# Patient Record
Sex: MALE | NOT HISPANIC OR LATINO
[De-identification: names, ages, dates, MRNs, and addresses within clinical notes are randomized per-mention and may not be internally consistent; named-entity substitution may affect disease eponyms.]

---

## 2017-03-10 ENCOUNTER — HOSPITAL ENCOUNTER (OUTPATIENT)
Dept: LAB | Age: 47
Setting detail: SPECIMEN
Discharge: HOME | End: 2017-03-10

## 2017-08-17 ENCOUNTER — RECORDS - HEALTHEAST (OUTPATIENT)
Dept: LAB | Facility: CLINIC | Age: 47
End: 2017-08-17

## 2017-08-17 LAB — AST SERPL W P-5'-P-CCNC: 22 U/L (ref 0–40)

## 2018-06-05 ENCOUNTER — RECORDS - HEALTHEAST (OUTPATIENT)
Dept: LAB | Facility: CLINIC | Age: 48
End: 2018-06-05

## 2018-06-05 LAB
ANION GAP SERPL CALCULATED.3IONS-SCNC: 9 MMOL/L (ref 5–18)
BUN SERPL-MCNC: 10 MG/DL (ref 8–22)
CALCIUM SERPL-MCNC: 9.1 MG/DL (ref 8.5–10.5)
CHLORIDE BLD-SCNC: 108 MMOL/L (ref 98–107)
CO2 SERPL-SCNC: 25 MMOL/L (ref 22–31)
CREAT SERPL-MCNC: 1.07 MG/DL (ref 0.7–1.3)
GFR SERPL CREATININE-BSD FRML MDRD: >60 ML/MIN/1.73M2
GLUCOSE BLD-MCNC: 102 MG/DL (ref 70–125)
POTASSIUM BLD-SCNC: 4.3 MMOL/L (ref 3.5–5)
SODIUM SERPL-SCNC: 142 MMOL/L (ref 136–145)

## 2018-08-14 ENCOUNTER — RECORDS - HEALTHEAST (OUTPATIENT)
Dept: LAB | Facility: CLINIC | Age: 48
End: 2018-08-14

## 2018-08-14 LAB
ALBUMIN SERPL-MCNC: 3.6 G/DL (ref 3.5–5)
ALP SERPL-CCNC: 72 U/L (ref 45–120)
ALT SERPL W P-5'-P-CCNC: 11 U/L (ref 0–45)
ANION GAP SERPL CALCULATED.3IONS-SCNC: 11 MMOL/L (ref 5–18)
AST SERPL W P-5'-P-CCNC: 12 U/L (ref 0–40)
BILIRUB SERPL-MCNC: 0.4 MG/DL (ref 0–1)
BUN SERPL-MCNC: 10 MG/DL (ref 8–22)
CALCIUM SERPL-MCNC: 9.1 MG/DL (ref 8.5–10.5)
CHLORIDE BLD-SCNC: 107 MMOL/L (ref 98–107)
CHOLEST SERPL-MCNC: 217 MG/DL
CO2 SERPL-SCNC: 23 MMOL/L (ref 22–31)
CREAT SERPL-MCNC: 0.94 MG/DL (ref 0.7–1.3)
FASTING STATUS PATIENT QL REPORTED: NO
GFR SERPL CREATININE-BSD FRML MDRD: >60 ML/MIN/1.73M2
GLUCOSE BLD-MCNC: 78 MG/DL (ref 70–125)
HDLC SERPL-MCNC: 47 MG/DL
LDLC SERPL CALC-MCNC: 150 MG/DL
POTASSIUM BLD-SCNC: 4.4 MMOL/L (ref 3.5–5)
PROT SERPL-MCNC: 6.6 G/DL (ref 6–8)
PSA SERPL-MCNC: 0.3 NG/ML (ref 0–2.5)
SODIUM SERPL-SCNC: 141 MMOL/L (ref 136–145)
TRIGL SERPL-MCNC: 98 MG/DL
VALPROATE SERPL-MCNC: 73 UG/ML (ref 50–150)

## 2018-10-12 ASSESSMENT — MIFFLIN-ST. JEOR: SCORE: 1836.48

## 2018-10-17 ENCOUNTER — ANESTHESIA - HEALTHEAST (OUTPATIENT)
Dept: SURGERY | Facility: AMBULATORY SURGERY CENTER | Age: 48
End: 2018-10-17

## 2018-10-18 ENCOUNTER — SURGERY - HEALTHEAST (OUTPATIENT)
Dept: SURGERY | Facility: AMBULATORY SURGERY CENTER | Age: 48
End: 2018-10-18

## 2018-10-18 ASSESSMENT — MIFFLIN-ST. JEOR: SCORE: 1836.48

## 2019-03-01 ENCOUNTER — AMBULATORY - HEALTHEAST (OUTPATIENT)
Dept: PALLIATIVE MEDICINE | Facility: OTHER | Age: 49
End: 2019-03-01

## 2019-03-01 DIAGNOSIS — M79.671 RIGHT FOOT PAIN: ICD-10-CM

## 2019-04-23 ENCOUNTER — RECORDS - HEALTHEAST (OUTPATIENT)
Dept: ADMINISTRATIVE | Facility: OTHER | Age: 49
End: 2019-04-23

## 2019-04-23 ENCOUNTER — HOSPITAL ENCOUNTER (OUTPATIENT)
Dept: PALLIATIVE MEDICINE | Facility: OTHER | Age: 49
Discharge: HOME | End: 2019-04-23
Attending: PODIATRIST

## 2019-04-23 DIAGNOSIS — G89.4 CHRONIC PAIN SYNDROME: ICD-10-CM

## 2019-04-23 DIAGNOSIS — M79.671 RIGHT FOOT PAIN: ICD-10-CM

## 2019-04-23 RX ORDER — OXYCODONE AND ACETAMINOPHEN 10; 325 MG/1; MG/1
1 TABLET ORAL EVERY 4 HOURS PRN
Status: SHIPPED | COMMUNITY
Start: 2019-04-23

## 2019-04-23 RX ORDER — NORTRIPTYLINE HCL 25 MG
25 CAPSULE ORAL AT BEDTIME
Status: SHIPPED | COMMUNITY
Start: 2019-04-23

## 2019-04-23 ASSESSMENT — MIFFLIN-ST. JEOR: SCORE: 1890.91

## 2019-04-24 ENCOUNTER — COMMUNICATION - HEALTHEAST (OUTPATIENT)
Dept: PALLIATIVE MEDICINE | Facility: OTHER | Age: 49
End: 2019-04-24

## 2019-05-10 ENCOUNTER — OFFICE VISIT - HEALTHEAST (OUTPATIENT)
Dept: PHYSICAL THERAPY | Facility: REHABILITATION | Age: 49
End: 2019-05-10

## 2019-05-10 DIAGNOSIS — D21.21 FIBROMA OF RIGHT FOOT: ICD-10-CM

## 2019-05-10 DIAGNOSIS — R26.81 UNSTEADY GAIT: ICD-10-CM

## 2019-05-10 DIAGNOSIS — M25.60 LIMITED JOINT RANGE OF MOTION (ROM): ICD-10-CM

## 2019-05-10 DIAGNOSIS — M72.2 PLANTAR FASCIITIS: ICD-10-CM

## 2019-05-17 ENCOUNTER — OFFICE VISIT - HEALTHEAST (OUTPATIENT)
Dept: PHYSICAL THERAPY | Facility: REHABILITATION | Age: 49
End: 2019-05-17

## 2019-05-17 DIAGNOSIS — D21.21 FIBROMA OF RIGHT FOOT: ICD-10-CM

## 2019-05-17 DIAGNOSIS — M72.2 PLANTAR FASCIITIS: ICD-10-CM

## 2019-05-17 DIAGNOSIS — M25.60 LIMITED JOINT RANGE OF MOTION (ROM): ICD-10-CM

## 2019-05-17 DIAGNOSIS — R26.81 UNSTEADY GAIT: ICD-10-CM

## 2019-05-24 ENCOUNTER — OFFICE VISIT - HEALTHEAST (OUTPATIENT)
Dept: PHYSICAL THERAPY | Facility: REHABILITATION | Age: 49
End: 2019-05-24

## 2019-05-24 DIAGNOSIS — M25.60 LIMITED JOINT RANGE OF MOTION (ROM): ICD-10-CM

## 2019-05-24 DIAGNOSIS — D21.21 FIBROMA OF RIGHT FOOT: ICD-10-CM

## 2019-05-24 DIAGNOSIS — R26.81 UNSTEADY GAIT: ICD-10-CM

## 2019-05-24 DIAGNOSIS — M72.2 PLANTAR FASCIITIS: ICD-10-CM

## 2019-05-31 ENCOUNTER — OFFICE VISIT - HEALTHEAST (OUTPATIENT)
Dept: PHYSICAL THERAPY | Facility: REHABILITATION | Age: 49
End: 2019-05-31

## 2019-05-31 DIAGNOSIS — M72.2 PLANTAR FASCIITIS: ICD-10-CM

## 2019-05-31 DIAGNOSIS — R26.81 UNSTEADY GAIT: ICD-10-CM

## 2019-05-31 DIAGNOSIS — D21.21 FIBROMA OF RIGHT FOOT: ICD-10-CM

## 2019-05-31 DIAGNOSIS — M25.60 LIMITED JOINT RANGE OF MOTION (ROM): ICD-10-CM

## 2019-06-14 ENCOUNTER — OFFICE VISIT - HEALTHEAST (OUTPATIENT)
Dept: PHYSICAL THERAPY | Facility: REHABILITATION | Age: 49
End: 2019-06-14

## 2019-06-14 DIAGNOSIS — D21.21 FIBROMA OF RIGHT FOOT: ICD-10-CM

## 2019-06-14 DIAGNOSIS — R26.81 UNSTEADY GAIT: ICD-10-CM

## 2019-06-14 DIAGNOSIS — M25.60 LIMITED JOINT RANGE OF MOTION (ROM): ICD-10-CM

## 2019-06-14 DIAGNOSIS — M72.2 PLANTAR FASCIITIS: ICD-10-CM

## 2019-06-21 ENCOUNTER — OFFICE VISIT - HEALTHEAST (OUTPATIENT)
Dept: PHYSICAL THERAPY | Facility: REHABILITATION | Age: 49
End: 2019-06-21

## 2019-06-21 DIAGNOSIS — D21.21 FIBROMA OF RIGHT FOOT: ICD-10-CM

## 2019-06-21 DIAGNOSIS — M72.2 PLANTAR FASCIITIS: ICD-10-CM

## 2019-06-21 DIAGNOSIS — R26.81 UNSTEADY GAIT: ICD-10-CM

## 2019-06-21 DIAGNOSIS — M25.60 LIMITED JOINT RANGE OF MOTION (ROM): ICD-10-CM

## 2019-07-12 ENCOUNTER — OFFICE VISIT - HEALTHEAST (OUTPATIENT)
Dept: PHYSICAL THERAPY | Facility: REHABILITATION | Age: 49
End: 2019-07-12

## 2019-07-12 DIAGNOSIS — M72.2 PLANTAR FASCIITIS: ICD-10-CM

## 2019-07-12 DIAGNOSIS — R26.81 UNSTEADY GAIT: ICD-10-CM

## 2019-07-12 DIAGNOSIS — D21.21 FIBROMA OF RIGHT FOOT: ICD-10-CM

## 2019-07-12 DIAGNOSIS — M25.60 LIMITED JOINT RANGE OF MOTION (ROM): ICD-10-CM

## 2019-07-26 ENCOUNTER — OFFICE VISIT - HEALTHEAST (OUTPATIENT)
Dept: PHYSICAL THERAPY | Facility: REHABILITATION | Age: 49
End: 2019-07-26

## 2019-07-26 DIAGNOSIS — D21.21 FIBROMA OF RIGHT FOOT: ICD-10-CM

## 2019-07-26 DIAGNOSIS — R26.81 UNSTEADY GAIT: ICD-10-CM

## 2019-07-26 DIAGNOSIS — M25.60 LIMITED JOINT RANGE OF MOTION (ROM): ICD-10-CM

## 2019-07-26 DIAGNOSIS — M72.2 PLANTAR FASCIITIS: ICD-10-CM

## 2019-08-02 ENCOUNTER — OFFICE VISIT - HEALTHEAST (OUTPATIENT)
Dept: PHYSICAL THERAPY | Facility: REHABILITATION | Age: 49
End: 2019-08-02

## 2019-08-02 DIAGNOSIS — M72.2 PLANTAR FASCIITIS: ICD-10-CM

## 2019-08-02 DIAGNOSIS — D21.21 FIBROMA OF RIGHT FOOT: ICD-10-CM

## 2019-08-02 DIAGNOSIS — R26.81 UNSTEADY GAIT: ICD-10-CM

## 2019-08-02 DIAGNOSIS — M25.60 LIMITED JOINT RANGE OF MOTION (ROM): ICD-10-CM

## 2019-08-09 ENCOUNTER — OFFICE VISIT - HEALTHEAST (OUTPATIENT)
Dept: PHYSICAL THERAPY | Facility: REHABILITATION | Age: 49
End: 2019-08-09

## 2019-08-09 DIAGNOSIS — R26.81 UNSTEADY GAIT: ICD-10-CM

## 2019-08-09 DIAGNOSIS — M72.2 PLANTAR FASCIITIS: ICD-10-CM

## 2019-08-09 DIAGNOSIS — M25.60 LIMITED JOINT RANGE OF MOTION (ROM): ICD-10-CM

## 2019-08-09 DIAGNOSIS — D21.21 FIBROMA OF RIGHT FOOT: ICD-10-CM

## 2019-08-16 ENCOUNTER — OFFICE VISIT - HEALTHEAST (OUTPATIENT)
Dept: PHYSICAL THERAPY | Facility: REHABILITATION | Age: 49
End: 2019-08-16

## 2019-08-16 DIAGNOSIS — M25.60 LIMITED JOINT RANGE OF MOTION (ROM): ICD-10-CM

## 2019-08-16 DIAGNOSIS — R26.81 UNSTEADY GAIT: ICD-10-CM

## 2019-08-16 DIAGNOSIS — M72.2 PLANTAR FASCIITIS: ICD-10-CM

## 2019-08-16 DIAGNOSIS — D21.21 FIBROMA OF RIGHT FOOT: ICD-10-CM

## 2019-08-23 ENCOUNTER — OFFICE VISIT - HEALTHEAST (OUTPATIENT)
Dept: PHYSICAL THERAPY | Facility: REHABILITATION | Age: 49
End: 2019-08-23

## 2019-08-23 DIAGNOSIS — R26.81 UNSTEADY GAIT: ICD-10-CM

## 2019-08-23 DIAGNOSIS — M72.2 PLANTAR FASCIITIS: ICD-10-CM

## 2019-08-23 DIAGNOSIS — D21.21 FIBROMA OF RIGHT FOOT: ICD-10-CM

## 2019-08-23 DIAGNOSIS — M25.60 LIMITED JOINT RANGE OF MOTION (ROM): ICD-10-CM

## 2019-09-20 ENCOUNTER — OFFICE VISIT - HEALTHEAST (OUTPATIENT)
Dept: PHYSICAL THERAPY | Facility: REHABILITATION | Age: 49
End: 2019-09-20

## 2019-09-20 DIAGNOSIS — R26.81 UNSTEADY GAIT: ICD-10-CM

## 2019-09-20 DIAGNOSIS — M25.60 LIMITED JOINT RANGE OF MOTION (ROM): ICD-10-CM

## 2019-09-20 DIAGNOSIS — M72.2 PLANTAR FASCIITIS: ICD-10-CM

## 2019-09-20 DIAGNOSIS — D21.21 FIBROMA OF RIGHT FOOT: ICD-10-CM

## 2019-09-30 ENCOUNTER — AMBULATORY - HEALTHEAST (OUTPATIENT)
Dept: ADMINISTRATIVE | Facility: REHABILITATION | Age: 49
End: 2019-09-30

## 2019-09-30 DIAGNOSIS — M79.671 RIGHT FOOT PAIN: ICD-10-CM

## 2019-10-11 ENCOUNTER — OFFICE VISIT - HEALTHEAST (OUTPATIENT)
Dept: PHYSICAL THERAPY | Facility: REHABILITATION | Age: 49
End: 2019-10-11

## 2019-10-11 DIAGNOSIS — D21.21 FIBROMA OF RIGHT FOOT: ICD-10-CM

## 2019-10-11 DIAGNOSIS — M25.60 LIMITED JOINT RANGE OF MOTION (ROM): ICD-10-CM

## 2019-10-11 DIAGNOSIS — R26.81 UNSTEADY GAIT: ICD-10-CM

## 2019-10-11 DIAGNOSIS — M72.2 PLANTAR FASCIITIS: ICD-10-CM

## 2019-11-01 ENCOUNTER — RECORDS - HEALTHEAST (OUTPATIENT)
Dept: LAB | Facility: CLINIC | Age: 49
End: 2019-11-01

## 2019-11-01 LAB
ALBUMIN SERPL-MCNC: 3.9 G/DL (ref 3.5–5)
ALP SERPL-CCNC: 90 U/L (ref 45–120)
ALT SERPL W P-5'-P-CCNC: 41 U/L (ref 0–45)
ANION GAP SERPL CALCULATED.3IONS-SCNC: 9 MMOL/L (ref 5–18)
AST SERPL W P-5'-P-CCNC: 28 U/L (ref 0–40)
BILIRUB SERPL-MCNC: 0.3 MG/DL (ref 0–1)
BUN SERPL-MCNC: 9 MG/DL (ref 8–22)
CALCIUM SERPL-MCNC: 9.4 MG/DL (ref 8.5–10.5)
CHLORIDE BLD-SCNC: 110 MMOL/L (ref 98–107)
CHOLEST SERPL-MCNC: 262 MG/DL
CO2 SERPL-SCNC: 25 MMOL/L (ref 22–31)
CREAT SERPL-MCNC: 1.06 MG/DL (ref 0.7–1.3)
FASTING STATUS PATIENT QL REPORTED: ABNORMAL
GFR SERPL CREATININE-BSD FRML MDRD: >60 ML/MIN/1.73M2
GLUCOSE BLD-MCNC: 99 MG/DL (ref 70–125)
HDLC SERPL-MCNC: 54 MG/DL
LDLC SERPL CALC-MCNC: 184 MG/DL
POTASSIUM BLD-SCNC: 4.5 MMOL/L (ref 3.5–5)
PROT SERPL-MCNC: 7.2 G/DL (ref 6–8)
PSA SERPL-MCNC: 0.4 NG/ML (ref 0–2.5)
SODIUM SERPL-SCNC: 144 MMOL/L (ref 136–145)
TRIGL SERPL-MCNC: 118 MG/DL

## 2020-05-13 ENCOUNTER — RECORDS - HEALTHEAST (OUTPATIENT)
Dept: ADMINISTRATIVE | Facility: OTHER | Age: 50
End: 2020-05-13

## 2020-08-28 ENCOUNTER — RECORDS - HEALTHEAST (OUTPATIENT)
Dept: LAB | Facility: CLINIC | Age: 50
End: 2020-08-28

## 2020-08-28 LAB
ANION GAP SERPL CALCULATED.3IONS-SCNC: 9 MMOL/L (ref 5–18)
BUN SERPL-MCNC: 8 MG/DL (ref 8–22)
CALCIUM SERPL-MCNC: 8.8 MG/DL (ref 8.5–10.5)
CHLORIDE BLD-SCNC: 108 MMOL/L (ref 98–107)
CHOLEST SERPL-MCNC: 179 MG/DL
CO2 SERPL-SCNC: 24 MMOL/L (ref 22–31)
CREAT SERPL-MCNC: 0.95 MG/DL (ref 0.7–1.3)
FASTING STATUS PATIENT QL REPORTED: NORMAL
GFR SERPL CREATININE-BSD FRML MDRD: >60 ML/MIN/1.73M2
GLUCOSE BLD-MCNC: 92 MG/DL (ref 70–125)
HDLC SERPL-MCNC: 45 MG/DL
LDLC SERPL CALC-MCNC: 117 MG/DL
POTASSIUM BLD-SCNC: 4.2 MMOL/L (ref 3.5–5)
PSA SERPL-MCNC: 0.7 NG/ML (ref 0–3.5)
SODIUM SERPL-SCNC: 141 MMOL/L (ref 136–145)
TRIGL SERPL-MCNC: 83 MG/DL

## 2021-05-25 ENCOUNTER — RECORDS - HEALTHEAST (OUTPATIENT)
Dept: ADMINISTRATIVE | Facility: CLINIC | Age: 51
End: 2021-05-25

## 2021-05-26 ENCOUNTER — RECORDS - HEALTHEAST (OUTPATIENT)
Dept: ADMINISTRATIVE | Facility: CLINIC | Age: 51
End: 2021-05-26

## 2021-05-28 NOTE — PROGRESS NOTES
[facility] Daily Progress     Patient Name: Kieran Quiroz  Date: 5/17/2019   Visit #: 2/12  PTA visit #:    Referral Diagnosis:  Right foot pain [M79.671]   Referring provider: Hussein Feliciano MD  Visit Diagnosis: No diagnosis found.  Precautions and medical history: Comorbid conditions include chronic headache/migraine on Aimovig, Major depression with psychotic features, chronic PTSD, Social anxiety disorder, MISHA, obsessive-compulsive disorder, alcohol dependence substance abuse in remission, marijuana use, schizoaffective disorder bipolar type. Pt reports chronic LBP and L shoulder pain   (Evaluate and treat right foot pain, hx fibroma excision x 2 () no further surgery recommended at this time.  Desensitization, scar/adhesion therapies.)  Assessment:   From Evaluation: Pt is a 48 y.o. year old male with right foot pain with hx of fibroma that started in 2016.  Pt had fibroma removed x2 but continues to grow back.  Patient has difficulty with standing, ambulating, working and sleeping.  Clinical findings include moderately sized plantar fibroma, hypersensitivity in plantar surface of foot, limited ankle ROM and strength.  Patient demonstrates understanding/independence with home program.  Patient is benefitting from skilled physical therapy and is making steady progress toward functional goals.  Patient is appropriate to continue with skilled physical therapy intervention, as indicated by initial plan of care.    Goal Status:  Pt. will demonstrate/verbalize independence in self-management of condition in : 12 weeks  Pt. will be independent with home exercise program in : 6 weeks  Pt. will report decreased intensity, frequency of : in 12 weeks;Comment  Comment:: in right foot  Pt. will have improved quality of sleep: with less pain;Comment  Comment:: in right foot while laying prone   Pt. will be able to walk : 10 minutes;with less pain;with less difficulty;for household mobility;in 12  weeks  Patient will stand : 10 minutes;with less pain;with less difficultty;in 12 weeks    No data recorded    Plan / Patient Education:     Continue with initial plan of care.  Progress with home program as tolerated.   Plan for next session:     Subjective:     Pain Rating: not assessed   Pt in foot upon sitting after standing or walking for longer then 10-15 min.  Increased low back pain reported.  PCA is helping with ex's daily.       Objective:   Exercises:  Exercise #1: ankle pumps, circles and alphabet (through out the day in supine and sitting) - cues for increased ROM  Comment #1: DF stretch with BTB in sitting / PF strengthening with BTB (needs help placing band)   Exercise #2: Plantar Fascia stretch with assist   Comment #2: Band: BTB eversion, inversion, PF and DF     Improved ROM in MTP joint, continues to have pain with MTP extension.     Treatment Today    TREATMENT MINUTES COMMENTS   Evaluation     Self-care/ Home management     Manual therapy 8 R foot plantar surface, use of warm towel    Neuromuscular Re-education     Therapeutic Activity     Therapeutic Exercises 8  See ex;s flow sheet    Gait training  Disssed use of cane on L side and demonstrated but pt reported L shoulder pain and inability to perform.  Also unable to use crunches due to L shoulder.    Modality________US_______ 8 plus set up               Total 25    Blank areas are intentional and mean the treatment did not include these items.       Sharon Zhu, PT, DPT   5/17/2019
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
## 2021-05-28 NOTE — TELEPHONE ENCOUNTER
Prior Authorization Request  Who s requesting:  KETURAH Reynolds CMA  Pharmacy Name and Location:   [facility]  Medication Name:  Lidocaine 5% patches  Insurance Plan:   [employer] MA  Insurance Member ID Number:   [id number]  Informed patient that prior authorizations can take up to 10 business days for response:   Yes  Okay to leave a detailed message: Yes

## 2021-05-28 NOTE — TELEPHONE ENCOUNTER
Central PA team  [phone]  Pool: HE PA MED ([id number])          PA has been initiated.       PA form completed and faxed insurance via Cover My Meds     Key:  [id number]     Medication:  Compound    [facility]         Response will be received via fax and may take up to 5-10 business days depending on plan

## 2021-05-29 ENCOUNTER — RECORDS - HEALTHEAST (OUTPATIENT)
Dept: ADMINISTRATIVE | Facility: CLINIC | Age: 51
End: 2021-05-29

## 2021-05-29 NOTE — PROGRESS NOTES
[facility] Daily Progress     Patient Name: Kieran Quiroz  Date: 6/21/2019   Visit #: 6/12  PTA visit #:    Referral Diagnosis:  Right foot pain [M79.671]   Referring provider: Hussein Feliciano MD  Visit Diagnosis:     ICD-10-CM    1. Fibroma of right foot D21.21    2. Plantar fasciitis M72.2    3. Unsteady gait R26.81    4. Limited joint range of motion (ROM) M25.60    Precautions and medical history: Comorbid conditions include chronic headache/migraine on Aimovig, Major depression with psychotic features, chronic PTSD, Social anxiety disorder, MISHA, obsessive-compulsive disorder, alcohol dependence substance abuse in remission, marijuana use, schizoaffective disorder bipolar type. Pt reports chronic LBP and L shoulder pain   (Evaluate and treat right foot pain, hx fibroma excision x 2 () no further surgery recommended at this time.  Desensitization, scar/adhesion therapies.)  Assessment:       From Evaluation: Pt is a 48 y.o. year old male with right foot pain with hx of fibroma that started in 2016.  Pt had fibroma removed x2 but continues to grow back.  Patient has difficulty with standing, ambulating, working and sleeping.  Clinical findings include moderately sized plantar fibroma, hypersensitivity in plantar surface of foot, limited ankle ROM and strength.  Patient demonstrates understanding/independence with home program.  Patient is benefitting from skilled physical therapy and is making steady progress toward functional goals.  Patient is appropriate to continue with skilled physical therapy intervention, as indicated by initial plan of care.    Goal Status:  Pt. will demonstrate/verbalize independence in self-management of condition in : 12 weeks  Pt. will be independent with home exercise program in : 6 weeks  Pt. will report decreased intensity, frequency of : in 12 weeks;Comment  Comment:: in right foot Progressing  Pt. will have improved quality of sleep: with less pain;Comment  MET no throbbing at night   Comment:: in right foot while laying prone  - up and down but increased medication   Pt. will be able to walk : 10 minutes;with less pain;with less difficulty;for household mobility;in 12 weeks - walked 4 blocks and had increased pain - iced and elevated   Patient will stand : 10 minutes;with less pain;with less difficultty;in 12 weeks MET able to stand 15-20 minutes    No data recorded    Plan / Patient Education:     Continue with initial plan of care.  Progress with home program as tolerated.   Plan for next session: US, K-tape, review HEP  2nd MTP joint mobs     Subjective:   Pt reports 35-40% improvement   Pt has not been able to obtain new orthotics for tennis shoes, continues to wear older orthotics in slip on shoes.   Still sore with standing a lot - soreness after 15-20 min   Still has to take weight off R side with cane  Pain overall has improved, no longer waking him up at night with pain.  Able to perform ex's with greater ease.     Objective:   Exercises:  Exercise #1: ankle pumps, circles and alphabet (through out the day in supine and sitting) - cues for increased ROM  Comment #1: DF stretch with BTB in sitting / PF strengthening with BTB (needs help placing band)   Exercise #2: Plantar Fascia stretch with assist   Comment #2: Band: BTB eversion, inversion, PF and DF   Exercise #3: gastroc stretch on stair (R side) 30 seconds x2    Today found pain in 2nd MTP joint with pressure along with pain in fibroma/arch of foot.     Past session: Discussed recent dx of peripheral neuropathy.  Pt reports numbness in his feet a few times a day.  Recommended regular foot care to avoid cuts/infection.     Treatment Today    TREATMENT MINUTES COMMENTS   Evaluation     Self-care/ Home management     Manual therapy 20 R foot plantar surface, use of warm towel x5  Warm towel with DF   STM to plantar surface of foot   Plantar fascia stretching with MTP joint ext  MTP 2nd digit mobs, STM   "  Neuromuscular Re-education 2 K-Tape  Prior to application skin was cleaned with alcohol wipe  1 strips were applied to plantar surface with mild stretch.  2 strips applied to plantar surface of foot one at MTP joint and one up medial aspect of ankle to support arch.  No stretch applied to 2\" ends of tape.   Pt was supine during application.   Described how to apply plantar taping and gave tape to apply at home.     Therapeutic Activity     Therapeutic Exercises  See ex;s flow sheet    Gait training     Modality________US_______ 8 plus set up 1.5 power, cont., 1.0MHz to plantar aspect of foot around scar and fibroma               Total 30    Blank areas are intentional and mean the treatment did not include these items.       Sharon Zhu, PT, DPT   6/21/2019  "

## 2021-05-29 NOTE — PROGRESS NOTES
[facility] Daily Progress     Patient Name: Kieran Quiroz  Date: 5/31/2019   Visit #: 4/12  PTA visit #:    Referral Diagnosis:  Right foot pain [M79.671]   Referring provider: Hussein Feliciano MD  Visit Diagnosis: No diagnosis found.  Precautions and medical history: Comorbid conditions include chronic headache/migraine on Aimovig, Major depression with psychotic features, chronic PTSD, Social anxiety disorder, MISHA, obsessive-compulsive disorder, alcohol dependence substance abuse in remission, marijuana use, schizoaffective disorder bipolar type. Pt reports chronic LBP and L shoulder pain   (Evaluate and treat right foot pain, hx fibroma excision x 2 () no further surgery recommended at this time.  Desensitization, scar/adhesion therapies.)  Assessment:   From Evaluation: Pt is a 48 y.o. year old male with right foot pain with hx of fibroma that started in 2016.  Pt had fibroma removed x2 but continues to grow back.  Patient has difficulty with standing, ambulating, working and sleeping.  Clinical findings include moderately sized plantar fibroma, hypersensitivity in plantar surface of foot, limited ankle ROM and strength.  Patient demonstrates understanding/independence with home program.  Patient is benefitting from skilled physical therapy and is making steady progress toward functional goals.  Patient is appropriate to continue with skilled physical therapy intervention, as indicated by initial plan of care.    Goal Status:  Pt. will demonstrate/verbalize independence in self-management of condition in : 12 weeks  Pt. will be independent with home exercise program in : 6 weeks  Pt. will report decreased intensity, frequency of : in 12 weeks;Comment  Comment:: in right foot Progressing  Pt. will have improved quality of sleep: with less pain;Comment MET no throbbing at night   Comment:: in right foot while laying prone   Pt. will be able to walk : 10 minutes;with less pain;with less  "difficulty;for household mobility;in 12 weeks  Patient will stand : 10 minutes;with less pain;with less difficultty;in 12 weeks    No data recorded    Plan / Patient Education:     Continue with initial plan of care.  Progress with home program as tolerated.   Plan for next session: US, K-tape  Review and progress ex's.  Gastroc/solues stretch     Subjective:   Still sore with standing a lot - soreness after 15-20 min   Walking 10 min   Pain Ratin.5-5 with walking - still has to take weight off R side with cane  Pain overall has improved, no longer waking him up at night with pain.  Able to perform ex's with greater ease.   Pt reports cramping of R great toe into extension   Pt reports fibroma has shrunk.     Objective:   Exercises:  Exercise #1: ankle pumps, circles and alphabet (through out the day in supine and sitting) - cues for increased ROM  Comment #1: DF stretch with BTB in sitting / PF strengthening with BTB (needs help placing band)   Exercise #2: Plantar Fascia stretch with assist   Comment #2: Band: BTB eversion, inversion, PF and DF   *trialed Gastoc stretch against the wall - increased pain in plantar surface of foot.     Discussed recent dx of peripheral neuropathy.  Pt reports numbness in his feet a few times a day.  Recommended regular foot care to avoid cuts/infection.     Treatment Today    TREATMENT MINUTES COMMENTS   Evaluation     Self-care/ Home management     Manual therapy 11 R foot plantar surface, use of warm towel   STM to plantar surface of foot   Plantar fascia stretching with MTP joint ext and DF    Neuromuscular Re-education 2 K-Tape  Prior to application skin was cleaned with alcohol wipe  1 strips were applied to plantar surface with mild stretch.  No stretch applied to 2\" ends of tape.   Pt was supine during application.   Described how to apply plantar taping and gave tape to apply at home.     Therapeutic Activity     Therapeutic Exercises 2 See ex;s flow sheet    Gait " training     Modality________US_______ 8 plus set up 1.5 power, cont., 1.0MHz to plantar aspect of foot around scar and fibroma               Total 25    Blank areas are intentional and mean the treatment did not include these items.       Sharon Zhu, PT, DPT   5/31/2019

## 2021-05-29 NOTE — PROGRESS NOTES
[facility] Daily Progress     Patient Name: Kieran Quiroz  Date: 6/14/2019   Visit #: 5/12  PTA visit #:    Referral Diagnosis:  Right foot pain [M79.671]   Referring provider: Hussein Feliciano MD  Visit Diagnosis:     ICD-10-CM    1. Fibroma of right foot D21.21    2. Plantar fasciitis M72.2    3. Unsteady gait R26.81    4. Limited joint range of motion (ROM) M25.60      Precautions and medical history: Comorbid conditions include chronic headache/migraine on Aimovig, Major depression with psychotic features, chronic PTSD, Social anxiety disorder, MISHA, obsessive-compulsive disorder, alcohol dependence substance abuse in remission, marijuana use, schizoaffective disorder bipolar type. Pt reports chronic LBP and L shoulder pain   (Evaluate and treat right foot pain, hx fibroma excision x 2 () no further surgery recommended at this time.  Desensitization, scar/adhesion therapies.)  Assessment:   From Evaluation: Pt is a 48 y.o. year old male with right foot pain with hx of fibroma that started in 2016.  Pt had fibroma removed x2 but continues to grow back.  Patient has difficulty with standing, ambulating, working and sleeping.  Clinical findings include moderately sized plantar fibroma, hypersensitivity in plantar surface of foot, limited ankle ROM and strength.  Patient demonstrates understanding/independence with home program.  Patient is benefitting from skilled physical therapy and is making steady progress toward functional goals.  Patient is appropriate to continue with skilled physical therapy intervention, as indicated by initial plan of care.    Goal Status:  Pt. will demonstrate/verbalize independence in self-management of condition in : 12 weeks  Pt. will be independent with home exercise program in : 6 weeks  Pt. will report decreased intensity, frequency of : in 12 weeks;Comment  Comment:: in right foot Progressing  Pt. will have improved quality of sleep: with less pain;Comment  "MET no throbbing at night   Comment:: in right foot while laying prone  - up and down but increased medication   Pt. will be able to walk : 10 minutes;with less pain;with less difficulty;for household mobility;in 12 weeks - walked 4 blocks and had increased pain - iced and elevated   Patient will stand : 10 minutes;with less pain;with less difficultty;in 12 weeks MET able to stand 15-20 minutes    No data recorded    Plan / Patient Education:     Continue with initial plan of care.  Progress with home program as tolerated.   Plan for next session: US, K-tape    Subjective:   Pt reports 35-40% improvement   Still sore with standing a lot - soreness after 15-20 min   Walked 4 blocks resulted in increased pain   Still has to take weight off R side with cane  Pain overall has improved, no longer waking him up at night with pain.  Able to perform ex's with greater ease.     Objective:   Exercises:  Exercise #1: ankle pumps, circles and alphabet (through out the day in supine and sitting) - cues for increased ROM  Comment #1: DF stretch with BTB in sitting / PF strengthening with BTB (needs help placing band)   Exercise #2: Plantar Fascia stretch with assist   Comment #2: Band: BTB eversion, inversion, PF and DF   Exercise #3: gastroc stretch on stair (R side) 30 seconds x2    Past session: Discussed recent dx of peripheral neuropathy.  Pt reports numbness in his feet a few times a day.  Recommended regular foot care to avoid cuts/infection.     Treatment Today    TREATMENT MINUTES COMMENTS   Evaluation     Self-care/ Home management     Manual therapy 20 R foot plantar surface, use of warm towel   STM to plantar surface of foot   Plantar fascia stretching with MTP joint ext and DF    Neuromuscular Re-education 2 K-Tape  Prior to application skin was cleaned with alcohol wipe  1 strips were applied to plantar surface with mild stretch.  No stretch applied to 2\" ends of tape.   Pt was supine during application.   Described " how to apply plantar taping and gave tape to apply at home.     Therapeutic Activity     Therapeutic Exercises 2 See ex;s flow sheet    Gait training     Modality________US_______ 8 plus set up 1.5 power, cont., 1.0MHz to plantar aspect of foot around scar and fibroma               Total 32    Blank areas are intentional and mean the treatment did not include these items.       Sharon Zhu, PT, DPT   6/14/2019

## 2021-05-29 NOTE — PROGRESS NOTES
[facility] Daily Progress     Patient Name: Kieran Quiroz  Date: 5/24/2019   Visit #: 3/12  PTA visit #:    Referral Diagnosis:  Right foot pain [M79.671]   Referring provider: Hussein Feliciano MD  Visit Diagnosis: No diagnosis found.  Precautions and medical history: Comorbid conditions include chronic headache/migraine on Aimovig, Major depression with psychotic features, chronic PTSD, Social anxiety disorder, MISHA, obsessive-compulsive disorder, alcohol dependence substance abuse in remission, marijuana use, schizoaffective disorder bipolar type. Pt reports chronic LBP and L shoulder pain   (Evaluate and treat right foot pain, hx fibroma excision x 2 () no further surgery recommended at this time.  Desensitization, scar/adhesion therapies.)  Assessment:   From Evaluation: Pt is a 48 y.o. year old male with right foot pain with hx of fibroma that started in 2016.  Pt had fibroma removed x2 but continues to grow back.  Patient has difficulty with standing, ambulating, working and sleeping.  Clinical findings include moderately sized plantar fibroma, hypersensitivity in plantar surface of foot, limited ankle ROM and strength.  Patient demonstrates understanding/independence with home program.  Patient is benefitting from skilled physical therapy and is making steady progress toward functional goals.  Patient is appropriate to continue with skilled physical therapy intervention, as indicated by initial plan of care.    Goal Status:  Pt. will demonstrate/verbalize independence in self-management of condition in : 12 weeks  Pt. will be independent with home exercise program in : 6 weeks  Pt. will report decreased intensity, frequency of : in 12 weeks;Comment  Comment:: in right foot Progressing  Pt. will have improved quality of sleep: with less pain;Comment MET  Comment:: in right foot while laying prone   Pt. will be able to walk : 10 minutes;with less pain;with less difficulty;for household  "mobility;in 12 weeks  Patient will stand : 10 minutes;with less pain;with less difficultty;in 12 weeks    No data recorded    Plan / Patient Education:     Continue with initial plan of care.  Progress with home program as tolerated.   Plan for next session: , K-tape  Review and progress ex's.  Gastroc/solues stretch     Subjective:     Pain Ratin.5-5 with walking - still has to take weight off R side with cane  Pain overall has improved, no longer waking him up at night with pain.  Able to perform ex's with greater ease.   Pt reports cramping of R great toe into extension   Pt reports fibroma has shrunk.     Objective:   Exercises:  Exercise #1: ankle pumps, circles and alphabet (through out the day in supine and sitting) - cues for increased ROM  Comment #1: DF stretch with BTB in sitting / PF strengthening with BTB (needs help placing band)   Exercise #2: Plantar Fascia stretch with assist   Comment #2: Band: BTB eversion, inversion, PF and DF     Improved ROM and decreased with ankle eversion, inversion, PF, great toe MTP.  Continues to have pain in plantar surface of foot with DF and MTP ext.     Treatment Today    TREATMENT MINUTES COMMENTS   Evaluation     Self-care/ Home management     Manual therapy 23 R foot plantar surface, use of warm towel   PROM ankle eversion, inversion, DF, PF, great MTP joint  Plantar fascia stretching with MTP joint ext and DF    Neuromuscular Re-education 2 K-Tape  Prior to application skin was cleaned with alcohol wipe  2 strips were applied to plantar surface and medial side of ankle for arch support with mild stretch.  No stretch applied to 2\" ends of tape.   Pt was supine during application.   Described how to apply plantar taping and gave tape to apply at home.  Discussed prognosis, likely fibroma will not go away with therapy but pt can have decreased pain and increased mobility.      Therapeutic Activity     Therapeutic Exercises  See ex;s flow sheet    Gait training  " Disssed use of cane on L side and demonstrated but pt reported L shoulder pain and inability to perform.  Also unable to use crunches due to L shoulder.    Modality________US_______ 10 plus set up 1.5 power, cont., 1.0MHz to plantar aspect of foot around scar and fibroma               Total 38    Blank areas are intentional and mean the treatment did not include these items.       Sharon Zhu, PT, DPT   5/24/2019

## 2021-05-30 NOTE — PROGRESS NOTES
[facility] Daily Progress     Patient Name: Kieran Quiroz  Date: 7/26/2019   Visit #: 8/12 ([facility] would like to to have a total of 17 session)   [facility] visit #:    Referral Diagnosis:  Right foot pain [M79.671]   Referring provider: Hussein Feliciano MD  Visit Diagnosis:     ICD-10-CM    1. Fibroma of right foot D21.21    2. Plantar fasciitis M72.2    3. Unsteady gait R26.81    4. Limited joint range of motion (ROM) M25.60    Precautions and medical history: Comorbid conditions include chronic headache/migraine on Aimovig, Major depression with psychotic features, chronic PTSD, Social anxiety disorder, MISHA, obsessive-compulsive disorder, alcohol dependence substance abuse in remission, marijuana use, schizoaffective disorder bipolar type. Pt reports chronic LBP and L shoulder pain   (Evaluate and treat right foot pain, hx fibroma excision x 2 () no further surgery recommended at this time.  Desensitization, scar/adhesion therapies.)  Assessment:   Pt returned to Podiatrist.  Podiatrist would like to continue therapy for 2 more months (8 sessions), if minimal improvement will consider surgery.  Improved ROM and strength noted today but pain persists. STM, K-tape and ultrasound performed for pain relief.      From Evaluation: Pt is a 48 y.o. year old male with right foot pain with hx of fibroma that started in 2016.  Pt had fibroma removed x2 but continues to grow back.  Patient has difficulty with standing, ambulating, working and sleeping.  Clinical findings include moderately sized plantar fibroma, hypersensitivity in plantar surface of foot, limited ankle ROM and strength.  Patient demonstrates understanding/independence with home program.  Patient is benefitting from skilled physical therapy and is making steady progress toward functional goals.  Patient is appropriate to continue with skilled physical therapy intervention, as indicated by initial plan of care.    Goal  Status:  Pt. will demonstrate/verbalize independence in self-management of condition in : 12 weeks  Pt. will be independent with home exercise program in : 6 weeks  Pt. will report decreased intensity, frequency of : in 12 weeks;Comment  Comment:: in right foot Progressing  Pt. will have improved quality of sleep: with less pain;Comment MET no throbbing at night   Comment:: in right foot while laying prone  - up and down but increased medication   Pt. will be able to walk : 10 minutes;with less pain;with less difficulty;for household mobility;in 12 weeks - walked 4 blocks and had increased pain - iced and elevated   Patient will stand : 10 minutes;with less pain;with less difficultty;in 12 weeks MET able to stand 15-20 minutes    No data recorded    Plan / Patient Education:     Continue with initial plan of care.  Progress with home program as tolerated.   Plan for next session: US, K-tape, review HEP  2nd MTP joint mobs     Subjective:   Pain increased while he was on vacation.  Growth of fibroma reported.   Past session :Pt reports 35-40% improvement     From Evaluation:   Pain Ratin  Pain rating at best: 5  Pain rating at worst: 10  Pain description: burn, stabbing   Sitting: no pain   Standin-5 minutes   Walking 1-2 minutes     Objective:   Foot/Ankle ROM:                          Date: 5/10/19                7/26/19  In sitting           Ankle PROM ( )   Right    Left   Right   Left   Right   Left   Dorsiflexion-Gastroc (10 )  Supine with bolster    Lacking 5    5   4-5            Dorsiflexion-Soleus (20 )                     Plantar Flexion (50 )   25   55   42            Inversion (45-60 )                     Eversion (15-30 )                     Great Toe Extension (70 )   20 painful    WFL   46        Foot/Ankle Strength:                      Date:  5/10/19                7/26/19        Ankle/Foot MMT (/5)   Right   Left   Right   Left   Right   Left   Dorsiflexion Unable to assess due to limited  "ROM and pain    5          Plantar flexion Tested in supine able to press slightly  3+             Inversion               Eversion               Great Toe Extension Unable to perform through full range           Great toe extension: 5/5 MMT    Exercises:  Exercise #1: ankle pumps, circles and alphabet (through out the day in supine and sitting) - cues for increased ROM  Comment #1: DF stretch with BTB in sitting / PF strengthening with BTB (needs help placing band)   Exercise #2: Plantar Fascia stretch with assist   Comment #2: Band: BTB eversion, inversion, PF and DF   Exercise #3: gastroc stretch on stair (R side) 30 seconds x2      Treatment Today    TREATMENT MINUTES COMMENTS   Evaluation     Self-care/ Home management     Manual therapy 8 R foot plantar surface, use of warm towel x5  Warm towel with DF   STM to plantar surface of foot   Plantar fascia stretching with MTP joint ext  MTP 2nd digit mobs, STM   +K-Tape   Neuromuscular Re-education  K-Tape  Prior to application skin was cleaned with alcohol wipe  1 strips were applied to plantar surface with mild stretch.  2 strips applied to plantar surface of foot one at MTP joint and one up medial aspect of ankle to support arch.  No stretch applied to 2\" ends of tape.   Pt was supine during application.   Described how to apply plantar taping and gave tape to apply at home.     Therapeutic Activity     Therapeutic Exercises 7 Reassessment above   See exs flow sheet    Gait training     Modality________US_______ 8 plus set up 1.5 power, cont., 1.0MHz to plantar aspect of foot around scar and fibroma               Total 25    Blank areas are intentional and mean the treatment did not include these items.       Sharon Zhu, PT, DPT   7/26/2019  "

## 2021-05-30 NOTE — PROGRESS NOTES
[facility] Daily Progress     Patient Name: Kieran Quiroz  Date: 7/12/2019   Visit #: 7/12  PTA visit #:    Referral Diagnosis:  Right foot pain [M79.671]   Referring provider: Hussein Feliciano MD  Visit Diagnosis:     ICD-10-CM    1. Fibroma of right foot D21.21    2. Plantar fasciitis M72.2    3. Unsteady gait R26.81    4. Limited joint range of motion (ROM) M25.60    Precautions and medical history: Comorbid conditions include chronic headache/migraine on Aimovig, Major depression with psychotic features, chronic PTSD, Social anxiety disorder, MISHA, obsessive-compulsive disorder, alcohol dependence substance abuse in remission, marijuana use, schizoaffective disorder bipolar type. Pt reports chronic LBP and L shoulder pain   (Evaluate and treat right foot pain, hx fibroma excision x 2 () no further surgery recommended at this time.  Desensitization, scar/adhesion therapies.)  Assessment:   Pt will return to Podiatrist.  Pain has returned while on vacation.  Hasn't had therapy in 2 weeks.  Fibroma appears to have enlarged.  STM, K-tape and ultrasound performed for pain relief.  Will call to reschedule after Podiatrist appointment.     From Evaluation: Pt is a 48 y.o. year old male with right foot pain with hx of fibroma that started in 2016.  Pt had fibroma removed x2 but continues to grow back.  Patient has difficulty with standing, ambulating, working and sleeping.  Clinical findings include moderately sized plantar fibroma, hypersensitivity in plantar surface of foot, limited ankle ROM and strength.  Patient demonstrates understanding/independence with home program.  Patient is benefitting from skilled physical therapy and is making steady progress toward functional goals.  Patient is appropriate to continue with skilled physical therapy intervention, as indicated by initial plan of care.    Goal Status:  Pt. will demonstrate/verbalize independence in self-management of condition in : 12  weeks  Pt. will be independent with home exercise program in : 6 weeks  Pt. will report decreased intensity, frequency of : in 12 weeks;Comment  Comment:: in right foot Progressing  Pt. will have improved quality of sleep: with less pain;Comment MET no throbbing at night   Comment:: in right foot while laying prone  - up and down but increased medication   Pt. will be able to walk : 10 minutes;with less pain;with less difficulty;for household mobility;in 12 weeks - walked 4 blocks and had increased pain - iced and elevated   Patient will stand : 10 minutes;with less pain;with less difficultty;in 12 weeks MET able to stand 15-20 minutes    No data recorded    Plan / Patient Education:     Continue with initial plan of care.  Progress with home program as tolerated.   Plan for next session: US, K-tape, review HEP  2nd MTP joint mobs     Subjective:   Pain increased while he was on vacation.  Growth of fibroma reported.   Past session :Pt reports 35-40% improvement     From Evaluation:   Pain Ratin  Pain rating at best: 5  Pain rating at worst: 10  Pain description: burn, stabbing   Sitting: no pain   Standin-5 minutes   Walking 1-2 minutes     Objective:   Foot/Ankle ROM:                          Date: 5/10/19                 Ankle PROM ( )   Right    Left   Right   Left   Right   Left   Dorsiflexion-Gastroc (10 )  Supine with bolster    Lacking 5    5               Dorsiflexion-Soleus (20 )                     Plantar Flexion (50 )   25   55               Inversion (45-60 )                     Eversion (15-30 )                     Great Toe Extension (70 )   20 painful    WFL           Foot/Ankle Strength:                      Date:  5/10/19              Ankle/Foot MMT (/5)   Right   Left   Right   Left   Right   Left   Dorsiflexion Unable to assess due to limited ROM and pain             Plantar flexion Tested in supine able to press slightly  3+             Inversion               Eversion              "  Great Toe Extension Unable to perform through full range               Exercises:  Exercise #1: ankle pumps, circles and alphabet (through out the day in supine and sitting) - cues for increased ROM  Comment #1: DF stretch with BTB in sitting / PF strengthening with BTB (needs help placing band)   Exercise #2: Plantar Fascia stretch with assist   Comment #2: Band: BTB eversion, inversion, PF and DF   Exercise #3: gastroc stretch on stair (R side) 30 seconds x2      Treatment Today    TREATMENT MINUTES COMMENTS   Evaluation     Self-care/ Home management     Manual therapy 15 R foot plantar surface, use of warm towel x5  Warm towel with DF   STM to plantar surface of foot   Plantar fascia stretching with MTP joint ext  MTP 2nd digit mobs, STM    Neuromuscular Re-education 2 K-Tape  Prior to application skin was cleaned with alcohol wipe  1 strips were applied to plantar surface with mild stretch.  2 strips applied to plantar surface of foot one at MTP joint and one up medial aspect of ankle to support arch.  No stretch applied to 2\" ends of tape.   Pt was supine during application.   Described how to apply plantar taping and gave tape to apply at home.     Therapeutic Activity     Therapeutic Exercises  See ex;s flow sheet    Gait training     Modality________US_______ 8 plus set up 1.5 power, cont., 1.0MHz to plantar aspect of foot around scar and fibroma               Total 25    Blank areas are intentional and mean the treatment did not include these items.       Sharon Zhu, PT, DPT   7/12/2019  "

## 2021-05-31 NOTE — PROGRESS NOTES
[facility] Daily Progress     Patient Name: Kieran Quiroz  Date: 8/16/2019   Visit #: 11/17 ([place] [facility]rist would like to to have a total of 17 session)   [facility] [place] visit #:    Referral Diagnosis:  Right foot pain [M79.671]   Referring provider: Hussein Feliciano MD  Visit Diagnosis:     ICD-10-CM    1. Fibroma of right foot D21.21    2. Plantar fasciitis M72.2    3. Unsteady gait R26.81    4. Limited joint range of motion (ROM) M25.60    Precautions and medical history: Comorbid conditions include chronic headache/migraine on Aimovig, Major depression with psychotic features, chronic PTSD, Social anxiety disorder, MISHA, obsessive-compulsive disorder, alcohol dependence substance abuse in remission, marijuana use, schizoaffective disorder bipolar type. Pt reports chronic LBP and L shoulder pain   (Evaluate and treat right foot pain, hx fibroma excision x 2 () no further surgery recommended at this time.  Desensitization, scar/adhesion therapies.)  Assessment:   Pt reports significant pain relief, able to walk without pain - unless he steps on sharp object.  No pain in Metatarsal joint since last session.  Foot intrinsic ex's performed today - minimal cueing needed.     Past session: Pt returned to Podiatrist.  Podiatrist would like to continue therapy for 2 more months (7 sessions), if minimal improvement will consider surgery.      From Evaluation: Pt is a 48 y.o. year old male with right foot pain with hx of fibroma that started in 2016.  Pt had fibroma removed x2 but continues to grow back.  Patient has difficulty with standing, ambulating, working and sleeping.  Clinical findings include moderately sized plantar fibroma, hypersensitivity in plantar surface of foot, limited ankle ROM and strength.  Patient demonstrates understanding/independence with home program.  Patient is benefitting from skilled physical therapy and is making steady progress toward functional goals.  Patient is  appropriate to continue with skilled physical therapy intervention, as indicated by initial plan of care.    Goal Status:  Pt. will demonstrate/verbalize independence in self-management of condition in : 12 weeks  Pt. will be independent with home exercise program in : 6 weeks  Pt. will report decreased intensity, frequency of : in 12 weeks;Comment  Comment:: in right foot Progressing  Pt. will have improved quality of sleep: with less pain;Comment MET no throbbing at night   Comment:: in right foot while laying prone  - up and down but increased medication   Pt. will be able to walk : 10 minutes;with less pain;with less difficulty;for household mobility;in 12 weeks - walked 4 blocks and had increased pain - iced and elevated   Patient will stand : 10 minutes;with less pain;with less difficultty;in 12 weeks MET able to stand 15-20 minutes    No data recorded    Plan / Patient Education:     Continue with initial plan of care.  Progress with home program as tolerated.   Plan for next session: US, review HEP    Subjective:   Pt report he could walk 30 min (but hasn't tried) without too much pain in foot.  Low back is limiting factor with walking.  Will try to walk this week.     From Evaluation:   Pain Ratin  Pain rating at best: 5  Pain rating at worst: 10  Pain description: burn, stabbing   Sitting: no pain   Standin-5 minutes   Walking 1-2 minutes     Objective:   Foot/Ankle ROM:                          Date: 5/10/19                7/26/19  In sitting           Ankle PROM ( )   Right    Left   Right   Left   Right   Left   Dorsiflexion-Gastroc (10 )  Supine with bolster    Lacking 5    5   4-5            Dorsiflexion-Soleus (20 )                     Plantar Flexion (50 )   25   55   42            Inversion (45-60 )                     Eversion (15-30 )                     Great Toe Extension (70 )   20 painful    WFL   46        Foot/Ankle Strength:                      Date:  5/10/19                7/26/19         Ankle/Foot MMT (/5)   Right   Left   Right   Left   Right   Left   Dorsiflexion Unable to assess due to limited ROM and pain    5          Plantar flexion Tested in supine able to press slightly  3+             Inversion               Eversion               Great Toe Extension Unable to perform through full range             Pain in the metatarsophalangeal joint digit 2 and 3 in plantar aspect for foot with weightbearing and palpation. Pt reports he has had pain in this area for years as reported last session. Today pt report no pain in this area with ambulating. Minimal pain with palpation     Exercises:  Comment #1: DF stretch with BTB in sitting / PF strengthening with BTB (needs help placing band)   Comment #2: Band: GTB eversion, inversion, PF and DF   Exercise #3: gastroc stretch on stair (R side) 30 seconds x2  Comment #3:  Heel raises x10 bilaterally - results in low back pain   Exercise #4: Foot intrinsics: towel scrunch, great toe extension, digit 2-5 extension, toe extension/flexion       Treatment Today    TREATMENT MINUTES COMMENTS   Evaluation     Self-care/ Home management     Manual therapy 16 R foot plantar surface, use of warm towel    STM to plantar surface of foot   Plantar fascia stretching with MTP joint ext  Mobs to Metatarsophalangeal joints digits 2-3   - No K-tape applied: pt reports pain when tape comes off.  Foot intrinsics started instead to strengthen arch.    Neuromuscular Re-education       Therapeutic Activity     Therapeutic Exercises 12 See exs flow sheet    Gait training     Modality________US_______ 8 plus set up 1.5 power, cont., 1.0MHz to plantar aspect of foot around scar and fibroma               Total 38    Blank areas are intentional and mean the treatment did not include these items.       Sharon Zhu, PT, DPT   8/16/2019

## 2021-05-31 NOTE — PROGRESS NOTES
[facility] Daily Progress     Patient Name: Kieran Quiroz  Date: 8/23/2019   Visit #: 12/17 ([place] [facility]rist would like to to have a total of 17 session)   [facility] visit #:    Referral Diagnosis:  Right foot pain [M79.671]   Referring provider: Hussein Feliciano MD  Visit Diagnosis:     ICD-10-CM    1. Fibroma of right foot D21.21    2. Plantar fasciitis M72.2    3. Unsteady gait R26.81    4. Limited joint range of motion (ROM) M25.60    Precautions and medical history: Comorbid conditions include chronic headache/migraine on Aimovig, Major depression with psychotic features, chronic PTSD, Social anxiety disorder, MISHA, obsessive-compulsive disorder, alcohol dependence substance abuse in remission, marijuana use, schizoaffective disorder bipolar type. Pt reports chronic LBP and L shoulder pain   (Evaluate and treat right foot pain, hx fibroma excision x 2 () no further surgery recommended at this time.  Desensitization, scar/adhesion therapies.)  Assessment:   Past session: Pt returned to Podiatrist.  Podiatrist would like to continue therapy for 2 more months (7 sessions), if minimal improvement will consider surgery.      From Evaluation: Pt is a 48 y.o. year old male with right foot pain with hx of fibroma that started in 2016.  Pt had fibroma removed x2 but continues to grow back.  Patient has difficulty with standing, ambulating, working and sleeping.  Clinical findings include moderately sized plantar fibroma, hypersensitivity in plantar surface of foot, limited ankle ROM and strength.  Patient demonstrates understanding/independence with home program.  Patient is benefitting from skilled physical therapy and is making steady progress toward functional goals.  Patient is appropriate to continue with skilled physical therapy intervention, as indicated by initial plan of care.    Goal Status:  Pt. will demonstrate/verbalize independence in self-management of condition in : 12 weeks  Pt.  will be independent with home exercise program in : 6 weeks  Pt. will report decreased intensity, frequency of : in 12 weeks;Comment  Comment:: in right foot Progressing  Pt. will have improved quality of sleep: with less pain;Comment MET no throbbing at night   Comment:: in right foot while laying prone  - up and down but increased medication   Pt. will be able to walk : 10 minutes;with less pain;with less difficulty;for household mobility;in 12 weeks - walked 4 blocks and had increased pain - iced and elevated   Patient will stand : 10 minutes;with less pain;with less difficultty;in 12 weeks MET able to stand 15-20 minutes    No data recorded    Plan / Patient Education:     Continue with initial plan of care.  Progress with home program as tolerated.   Plan for next session: US, review HEP    Subjective:   R foot is a little more sore.  Went around a couple blocks (3 blocks 10-15 min)     Pt report he could walk 30 min (but hasn't tried) without too much pain in foot.  Low back is limiting factor with walking.  Will try to walk this week.     From Evaluation:   Pain Ratin  Pain rating at best: 5  Pain rating at worst: 10  Pain description: burn, stabbing   Sitting: no pain   Standin-5 minutes   Walking 1-2 minutes     Objective:   Foot/Ankle ROM:                          Date: 5/10/19                7/26/19  In sitting           Ankle PROM ( )   Right    Left   Right   Left   Right   Left   Dorsiflexion-Gastroc (10 )  Supine with bolster    Lacking 5    5   4-5            Dorsiflexion-Soleus (20 )                     Plantar Flexion (50 )   25   55   42            Inversion (45-60 )                     Eversion (15-30 )                     Great Toe Extension (70 )   20 painful    WFL   46        Foot/Ankle Strength:                      Date:  5/10/19                7/26/19        Ankle/Foot MMT (/5)   Right   Left   Right   Left   Right   Left   Dorsiflexion Unable to assess due to limited ROM and pain     5          Plantar flexion Tested in supine able to press slightly  3+             Inversion               Eversion               Great Toe Extension Unable to perform through full range             Past session: Pain in the metatarsophalangeal joint digit 2 and 3 in plantar aspect for foot with weightbearing and palpation. Pt reports he has had pain in this area for years as reported last session. Today pt report no pain in this area with ambulating. Minimal pain with palpation     Exercises:  Comment #1: DF stretch with BTB in sitting / PF strengthening with BTB (needs help placing band)   Comment #2: Band: GTB eversion, inversion, PF and DF   Exercise #3: gastroc stretch on stair (R side) 30 seconds x2  Comment #3:  Heel raises x10 bilaterally - results in low back pain   Exercise #4: Foot intrinsics: towel scrunch, great toe extension, digit 2-5 extension, toe extension/flexion       Treatment Today    TREATMENT MINUTES COMMENTS   Evaluation     Self-care/ Home management     Manual therapy 25 R foot plantar surface, use of warm towel    STM to plantar surface of foot   Plantar fascia stretching with MTP joint ext  Mobs to Metatarsophalangeal joints digits 2-3    K-tape applied: pt reported increased pain after last session with no K-tape applied per pt request    Neuromuscular Re-education       Therapeutic Activity     Therapeutic Exercises 10 See exs flow sheet    Gait training     Modality________US_______ 8 plus set up 1.5 power, cont., 1.0MHz to plantar aspect of foot around scar and fibroma               Total 45    Blank areas are intentional and mean the treatment did not include these items.       Sharon Zhu, PT, DPT   8/23/2019

## 2021-05-31 NOTE — PROGRESS NOTES
[facility] Daily Progress     Patient Name: Kieran Quiroz  Date: 8/9/2019   Visit #: 9/17 ([place] [facility]rist would like to to have a total of 17 session)   hospitals visit #:    Referral Diagnosis:  Right foot pain [M79.671]   Referring provider: Hussein Feliciano MD  Visit Diagnosis:     ICD-10-CM    1. Fibroma of right foot D21.21    2. Plantar fasciitis M72.2    3. Unsteady gait R26.81    4. Limited joint range of motion (ROM) M25.60    Precautions and medical history: Comorbid conditions include chronic headache/migraine on Aimovig, Major depression with psychotic features, chronic PTSD, Social anxiety disorder, MISHA, obsessive-compulsive disorder, alcohol dependence substance abuse in remission, marijuana use, schizoaffective disorder bipolar type. Pt reports chronic LBP and L shoulder pain   (Evaluate and treat right foot pain, hx fibroma excision x 2 () no further surgery recommended at this time.  Desensitization, scar/adhesion therapies.)  Assessment:   Pt returned to Podiatrist.  Podiatrist would like to continue therapy for 2 more months (7 sessions), if minimal improvement will consider surgery.  STM, K-tape and ultrasound performed for pain relief.  Pain in Metatarsophalangeal joints on R digits 2-3.      From Evaluation: Pt is a 48 y.o. year old male with right foot pain with hx of fibroma that started in 2016.  Pt had fibroma removed x2 but continues to grow back.  Patient has difficulty with standing, ambulating, working and sleeping.  Clinical findings include moderately sized plantar fibroma, hypersensitivity in plantar surface of foot, limited ankle ROM and strength.  Patient demonstrates understanding/independence with home program.  Patient is benefitting from skilled physical therapy and is making steady progress toward functional goals.  Patient is appropriate to continue with skilled physical therapy intervention, as indicated by initial plan of care.    Goal Status:  Pt.  will demonstrate/verbalize independence in self-management of condition in : 12 weeks  Pt. will be independent with home exercise program in : 6 weeks  Pt. will report decreased intensity, frequency of : in 12 weeks;Comment  Comment:: in right foot Progressing  Pt. will have improved quality of sleep: with less pain;Comment MET no throbbing at night   Comment:: in right foot while laying prone  - up and down but increased medication   Pt. will be able to walk : 10 minutes;with less pain;with less difficulty;for household mobility;in 12 weeks - walked 4 blocks and had increased pain - iced and elevated   Patient will stand : 10 minutes;with less pain;with less difficultty;in 12 weeks MET able to stand 15-20 minutes    No data recorded    Plan / Patient Education:     Continue with initial plan of care.  Progress with home program as tolerated.   Plan for next session: US, K-tape, foot intrinsics,   2nd MTP joint mobs     Subjective:   Pain has been waking him up at night.  Increased gabapentin a couple weeks ago.      From Evaluation:   Pain Ratin  Pain rating at best: 5  Pain rating at worst: 10  Pain description: burn, stabbing   Sitting: no pain   Standin-5 minutes   Walking 1-2 minutes     Objective:   Foot/Ankle ROM:                          Date: 5/10/19                7/26/19  In sitting           Ankle PROM ( )   Right    Left   Right   Left   Right   Left   Dorsiflexion-Gastroc (10 )  Supine with bolster    Lacking 5    5   4-5            Dorsiflexion-Soleus (20 )                     Plantar Flexion (50 )   25   55   42            Inversion (45-60 )                     Eversion (15-30 )                     Great Toe Extension (70 )   20 painful    WFL   46        Foot/Ankle Strength:                      Date:  5/10/19                7/26/19        Ankle/Foot MMT (/5)   Right   Left   Right   Left   Right   Left   Dorsiflexion Unable to assess due to limited ROM and pain    5          Plantar flexion  "Tested in supine able to press slightly  3+             Inversion               Eversion               Great Toe Extension Unable to perform through full range           Great toe extension: 5/5 MMT    Pain in the metatarsophalangeal joint digit 2 and 3 in plantar aspect for foot with weightbearing and palpation   Pt reports he has had pain in this area for years.     Exercises:  Exercise #1: ankle pumps, circles and alphabet (through out the day in supine and sitting) - cues for increased ROM  Comment #1: DF stretch with BTB in sitting / PF strengthening with BTB (needs help placing band)   Comment #2: Band: BTB eversion, inversion, PF and DF   Exercise #3: gastroc stretch on stair (R side) 30 seconds x2  Comment #3:  Heel raises x10 bilaterally - results in low back pain       Treatment Today    TREATMENT MINUTES COMMENTS   Evaluation     Self-care/ Home management     Manual therapy 22 R foot plantar surface, use of warm towel    STM to plantar surface of foot   Plantar fascia stretching with MTP joint ext  Mobs to Metatarsophalangeal joints digits 2-3   +K-Tape   Neuromuscular Re-education  K-Tape  Prior to application skin was cleaned with alcohol wipe  1 strips were applied to plantar surface with mild stretch.  2 strips applied to plantar surface of foot one at MTP joint and one up medial aspect of ankle to support arch.  No stretch applied to 2\" ends of tape.   Pt was supine during application.   Described how to apply plantar taping and gave tape to apply at home.     Therapeutic Activity     Therapeutic Exercises 8 Reassessment above   See exs flow sheet    Gait training     Modality________US_______ 8 plus set up 1.5 power, cont., 1.0MHz to plantar aspect of foot around scar and fibroma               Total 40    Blank areas are intentional and mean the treatment did not include these items.       Sharon Zhu, PT, DPT   8/9/2019  "

## 2021-05-31 NOTE — PROGRESS NOTES
[facility] Daily Progress     Patient Name: Kieran Quiroz  Date: 8/1/2019   Visit #: 9/17 ([place] [facility]rist would like to to have a total of 17 session)   [facility] [place] visit #:    Referral Diagnosis:  Right foot pain [M79.671]   Referring provider: Hussein Feliciano MD  Visit Diagnosis:   No diagnosis found.Precautions and medical history: Comorbid conditions include chronic headache/migraine on Aimovig, Major depression with psychotic features, chronic PTSD, Social anxiety disorder, MISHA, obsessive-compulsive disorder, alcohol dependence substance abuse in remission, marijuana use, schizoaffective disorder bipolar type. Pt reports chronic LBP and L shoulder pain   (Evaluate and treat right foot pain, hx fibroma excision x 2 () no further surgery recommended at this time.  Desensitization, scar/adhesion therapies.)  Assessment:   Podiatrist would like pt to continue therapy for 2 more months (7 sessions), if minimal improvement will consider surgery. Increased functional mobility with standing and walking.  Slight decrease in fibroma noted today. STM, K-tape and ultrasound performed for pain relief.      From Evaluation: Pt is a 48 y.o. year old male with right foot pain with hx of fibroma that started in 2016.  Pt had fibroma removed x2 but continues to grow back.  Patient has difficulty with standing, ambulating, working and sleeping.  Clinical findings include moderately sized plantar fibroma, hypersensitivity in plantar surface of foot, limited ankle ROM and strength.  Patient demonstrates understanding/independence with home program.  Patient is benefitting from skilled physical therapy and is making steady progress toward functional goals.  Patient is appropriate to continue with skilled physical therapy intervention, as indicated by initial plan of care.    Goal Status:  Pt. will demonstrate/verbalize independence in self-management of condition in : 12 weeks  Pt. will be independent with  home exercise program in : 6 weeks  Pt. will report decreased intensity, frequency of : in 12 weeks;Comment  Comment:: in right foot Progressing  Pt. will have improved quality of sleep: with less pain;Comment MET no throbbing at night   Comment:: in right foot while laying prone  - up and down but increased medication   Pt. will be able to walk : 10 minutes;with less pain;with less difficulty;for household mobility;in 12 weeks - walked 4 blocks and had increased pain - iced and elevated   Patient will stand : 10 minutes;with less pain;with less difficultty;in 12 weeks MET able to stand 15-20 minutes    No data recorded    Plan / Patient Education:     Continue with initial plan of care.  Progress with home program as tolerated.   Plan for next session: US, K-tape, review HEP  2nd MTP joint mobs     Subjective:   Pain in left knee - pt relates to using LE more due to R foot pain.   Pain is not bad today.     From Evaluation/ 19  Pain Ratin/ 4-5  Pain rating at best: 5/ 4-5  Pain rating at worst: 10/ 10  Pain description: burn, stabbing   Sitting: no pain   Standin-5 minutes / 10-15 min   Walking 1-2 minutes / 10-15 min     Objective:   Foot/Ankle ROM:                          Date: 5/10/19                7/26/19  In sitting           Ankle PROM ( )   Right    Left   Right   Left   Right   Left   Dorsiflexion-Gastroc (10 )  Supine with bolster    Lacking 5    5   4-5            Dorsiflexion-Soleus (20 )                     Plantar Flexion (50 )   25   55   42            Inversion (45-60 )                     Eversion (15-30 )                     Great Toe Extension (70 )   20 painful    WFL   46        Foot/Ankle Strength:                      Date:  5/10/19                7/26/19        Ankle/Foot MMT (/5)   Right   Left   Right   Left   Right   Left   Dorsiflexion Unable to assess due to limited ROM and pain    5          Plantar flexion Tested in supine able to press slightly  3+             Inversion    "            Eversion               Great Toe Extension Unable to perform through full range           Great toe extension: 5/5 MMT    Exercises:  Exercise #1: ankle pumps, circles and alphabet (through out the day in supine and sitting) - cues for increased ROM  Comment #1: DF stretch with BTB in sitting / PF strengthening with BTB (needs help placing band)   Exercise #2: Plantar Fascia stretch with assist   Comment #2: Band: BTB eversion, inversion, PF and DF   Exercise #3: gastroc stretch on stair (R side) 30 seconds x2      Treatment Today    TREATMENT MINUTES COMMENTS   Evaluation     Self-care/ Home management     Manual therapy 16 R foot plantar surface, use of warm towel x5  STM to plantar surface of foot   Plantar fascia stretching with MTP joint ext  +K-Tape   Neuromuscular Re-education  K-Tape  Prior to application skin was cleaned with alcohol wipe  1 strips were applied to plantar surface with mild stretch.  2 strips applied to plantar surface of foot one at MTP joint and one up medial aspect of ankle to support arch.  No stretch applied to 2\" ends of tape.   Pt was supine during application.   Described how to apply plantar taping and gave tape to apply at home.     Therapeutic Activity     Therapeutic Exercises 10 Reassessment above   See exs flow sheet    Gait training     Modality________US_______ 10 plus set up 1.5 power, cont., 1.0MHz to plantar aspect of foot around scar and fibroma   Great toe extension for fascial stretch 4 min               Total 38    Blank areas are intentional and mean the treatment did not include these items.       Sharon Zhu, PT, DPT   8/1/2019  "

## 2021-06-01 NOTE — PROGRESS NOTES
[facility] Daily Progress     Patient Name: Kieran Quiroz  Date: 9/20/2019   Visit #: 13/17 ([facility] would like to to have a total of 17 session)   PTA visit #:    Referral Diagnosis:  Right foot pain [M79.671]   Referring provider: Hussein Feliciano MD  Visit Diagnosis:   No diagnosis found.Precautions and medical history: Comorbid conditions include chronic headache/migraine on Aimovig, Major depression with psychotic features, chronic PTSD, Social anxiety disorder, MISHA, obsessive-compulsive disorder, alcohol dependence substance abuse in remission, marijuana use, schizoaffective disorder bipolar type. Pt reports chronic LBP and L shoulder pain   (Evaluate and treat right foot pain, hx fibroma excision x 2 () no further surgery recommended at this time.  Desensitization, scar/adhesion therapies.)  Assessment:   D/C patient today.  Pt has hit a plateau in therapy.  Pt originally improved with ankle ROM and pain ratings, but continues to have pain with weight bearing.  Pt will return to Podiatrist     From Evaluation: Pt is a 48 y.o. year old male with right foot pain with hx of fibroma that started in 2016.  Pt had fibroma removed x2 but continues to grow back.  Patient has difficulty with standing, ambulating, working and sleeping.  Clinical findings include moderately sized plantar fibroma, hypersensitivity in plantar surface of foot, limited ankle ROM and strength.  Patient demonstrates understanding/independence with home program.  Patient is benefitting from skilled physical therapy and is making steady progress toward functional goals.  Patient is appropriate to continue with skilled physical therapy intervention, as indicated by initial plan of care.    Goal Status:  Pt. will demonstrate/verbalize independence in self-management of condition in : 12 weeks Progressing   Pt. will be independent with home exercise program in : 6 weeks MET (modifiend independent)   Pt. will  report decreased intensity, frequency of : in 12 weeks;Comment  Comment:: in right foot Progressing  Pt. will have improved quality of sleep: with less pain;Comment MET no throbbing at night   Comment:: in right foot while laying prone  - up and down but increased medication   Pt. will be able to walk : 10 minutes;with less pain;with less difficulty;for household mobility;in 12 weeks - walked 4 blocks and had increased pain - iced and elevated   Patient will stand : 10 minutes;with less pain;with less difficultty;in 12 weeks MET able to stand 15-20 minutes    No data recorded    Plan / Patient Education:     d/c        Subjective:   R foot is a little more sore.  Planning to return to Podiatrist   Objective:   Foot/Ankle ROM:                          Date: 5/10/19                7/26/19  In sitting           Ankle PROM ( )   Right    Left   Right   Left   Right   Left   Dorsiflexion-Gastroc (10 )  Supine with bolster    Lacking 5    5   4-5      6      Dorsiflexion-Soleus (20 )                     Plantar Flexion (50 )   25   55   42      52      Inversion (45-60 )                     Eversion (15-30 )                     Great Toe Extension (70 )   20 painful    WFL   46        Foot/Ankle Strength:                      Date:  5/10/19                7/26/19        Ankle/Foot MMT (/5)   Right   Left   Right   Left   Right   Left   Dorsiflexion Unable to assess due to limited ROM and pain    5          Plantar flexion Tested in supine able to press slightly  3+             Inversion               Eversion               Great Toe Extension Unable to perform through full range              Pain in the metatarsophalangeal joint digit 2 and 3 in plantar aspect for foot with weightbearing and palpation.    Exercises:  Comment #1: DF stretch with BTB in sitting / PF strengthening with BTB (needs help placing band)   Comment #2: Band: GTB eversion, inversion, PF and DF x15  Exercise #3: gastroc stretch on stair (R side) 30  seconds x2  Comment #3:  Heel raises x10 bilaterally - results in low back pain   Exercise #4: Foot intrinsics: towel scrunch, great toe extension, digit 2-5 extension, toe extension/flexion   Verbal review of HEP + foot intrinsics     Treatment Today    TREATMENT MINUTES COMMENTS   Evaluation     Self-care/ Home management     Manual therapy 8 R foot plantar surface, use of warm towel    STM to plantar surface of foot   Plantar fascia stretching with MTP joint ext  Mobs to Metatarsophalangeal joints digits 2-3    Neuromuscular Re-education       Therapeutic Activity     Therapeutic Exercises 8 See exs flow sheet    Gait training     Modality________US_______ 8 plus set up 1.5 power, cont., 1.0MHz to plantar aspect of foot around scar and fibroma               Total 26    Blank areas are intentional and mean the treatment did not include these items.       Sharon Zhu, PT, DPT   9/20/2019

## 2021-06-02 VITALS — WEIGHT: 201 LBS | HEIGHT: 74 IN | BODY MASS INDEX: 25.8 KG/M2

## 2021-06-02 NOTE — PROGRESS NOTES
[facility] Certification Request    November 19, 2019      Patient: Kieran Quiroz  MR Number: [id number]  YOB: 1970  Date of Visit: 10/11/2019      Dear Dr. Spring    Thank you for this referral.   We are seeing Kieran Quiroz for Physical Therapy of right foot.    Medicare and/or Medicaid requires physician review and approval of the treatment plan. Please review the plan of care and verify that you agree with the therapy plan of care by co-signing this note.      Plan of Care:   Authorization / Certification Start Date: 05/10/19  Authorization / Certification End Date: 10/11/19  Authorization / Certification Number of Visits: 17  Communication with: Referral Source  Patient Related Instruction: Nature of Condition;Treatment plan and rationale;Self Care instruction;Basis of treatment;Body mechanics;Precautions;Next steps  Times per Week: 1  Number of Weeks: 17  Number of Visits: up to 17  Discharge Planning: self-managment of symptoms   Therapeutic Exercise: ROM;Stretching;Strengthening  Neuromuscular Reeducation: kinesio tape;posture;balance/proprioception;core  Manual Therapy: soft tissue mobilization;myofascial release;joint mobilization  Modalities: TENS;ultrasound;cold pack;hot pack  Gait Training: normalize gait with SEC  Equipment: [place]ia[place]     Goals:Goals:  Pt. will demonstrate/verbalize independence in self-management of condition in : 12 weeks  Pt. will be independent with home exercise program in : 6 weeks  Pt. will report decreased intensity, frequency of : in 12 weeks;Comment  Comment:: in right foot  Pt. will have improved quality of sleep: with less pain;Comment  Comment:: in right foot while laying prone   Pt. will be able to walk : 10 minutes;with less pain;with less difficulty;for household mobility;in 12 weeks  Patient will stand : 10 minutes;with less pain;with less difficultty;in 12 weeks       If you have any questions or concerns, please don't hesitate to  call.    Sincerely,      Sharon Zhu, PT        Physician recommendation:     ___ Follow therapist's recommendation        ___ Modify therapy      *Physician co-signature indicates they certify the need for these services furnished within this plan and while under their care.    [facility] Daily Progress     Patient Name: Kieran Quiroz  Date: 10/11/2019   Visit #: 14/17 ([facility] would like to to have a total of 17 session)   PTA visit #:    Referral Diagnosis:  Right foot pain [M79.671]   Referring provider: Nelida Dejesus PA*  Visit Diagnosis:     ICD-10-CM    1. Fibroma of right foot D21.21    2. Plantar fasciitis M72.2    3. Unsteady gait R26.81    4. Limited joint range of motion (ROM) M25.60       Precautions and medical history: Comorbid conditions include chronic headache/migraine on Aimovig, Major depression with psychotic features, chronic PTSD, Social anxiety disorder, MISHA, obsessive-compulsive disorder, alcohol dependence substance abuse in remission, marijuana use, schizoaffective disorder bipolar type. Pt reports chronic LBP and L shoulder pain   (Evaluate and treat right foot pain, hx fibroma excision x 2 () no further surgery recommended at this time.  Desensitization, scar/adhesion therapies.)  Assessment:   Pt returned today with a new order for his right foot pain.  Pt has hit a plateau in therapy, has been seen 14 times from 5/10/19 to 9/20/19.  Pt originally improved with ankle ROM and pain ratings, but continues to have pain with weight bearing and growth of fibroma as reported by Podiatrist.    PT will call Podiatrist to discuss POC but likely will d/c pt today as no further therapy is indicated.     From Evaluation: Pt is a 48 y.o. year old male with right foot pain with hx of fibroma that started in 2016.  Pt had fibroma removed x2 but continues to grow back.  Patient has difficulty with standing, ambulating, working and sleeping.  Clinical  findings include moderately sized plantar fibroma, hypersensitivity in plantar surface of foot, limited ankle ROM and strength.  Patient demonstrates understanding/independence with home program.  Patient is benefitting from skilled physical therapy and is making steady progress toward functional goals.  Patient is appropriate to continue with skilled physical therapy intervention, as indicated by initial plan of care.    Goal Status:  Pt. will demonstrate/verbalize independence in self-management of condition in : 12 weeks Progressing   Pt. will be independent with home exercise program in : 6 weeks MET (modifiend independent)   Pt. will report decreased intensity, frequency of : in 12 weeks;Comment  Comment:: in right foot Progressing  Pt. will have improved quality of sleep: with less pain;Comment MET no throbbing at night   Comment:: in right foot while laying prone  - up and down but increased medication   Pt. will be able to walk : 10 minutes;with less pain;with less difficulty;for household mobility;in 12 weeks - walked 4 blocks and had increased pain - iced and elevated   Patient will stand : 10 minutes;with less pain;with less difficultty;in 12 weeks MET able to stand 15-20 minutes    No data recorded    Plan / Patient Education:     d/c    Will call Dr. Spring at [facility].     Subjective:   Came to therapy today with new order.   Having same pain.  Will  new orthotics today (widened).  Per pt report fibroma has enlarged but does not want to do surgery for 3rd time as surgeon reports he would loss his balance.   Objective:   Foot/Ankle ROM:                          Date: 5/10/19                7/26/19  In sitting           Ankle PROM ( )   Right    Left   Right   Left   Right   Left   Dorsiflexion-Gastroc (10 )  Supine with bolster    Lacking 5    5   4-5      6      Dorsiflexion-Soleus (20 )                     Plantar Flexion (50 )   25   55   42      52      Inversion (45-60 )                      Eversion (15-30 )                     Great Toe Extension (70 )   20 painful    WFL   46        Foot/Ankle Strength:                      Date:  5/10/19                7/26/19        Ankle/Foot MMT (/5)   Right   Left   Right   Left   Right   Left   Dorsiflexion Unable to assess due to limited ROM and pain    5          Plantar flexion Tested in supine able to press slightly  3+             Inversion               Eversion               Great Toe Extension Unable to perform through full range                Treatment Today    TREATMENT MINUTES COMMENTS   Evaluation     Self-care/ Home management 13 Cut a blue foam cushion for R shoe.  Pt was not wearing orthotics today.  Cut out a whole about the size of the fibroma.  Pt reports pain relief.  Would not be able to use with orthotic but can place in shoes that do not fit orthotic.    Manual therapy 10 R foot plantar surface, use of warm towel    STM to plantar surface of foot   Plantar fascia stretching with MTP joint ext  Mobs to Metatarsophalangeal joints digits 2-3    Neuromuscular Re-education       Therapeutic Activity     Therapeutic Exercises     Gait training     Modality________US_______ Past session 1.5 power, cont., 1.0MHz to plantar aspect of foot around scar and fibroma               Total 23    Blank areas are intentional and mean the treatment did not include these items.       Sharon Zhu, PT, DPT   10/11/2019     [facility] Discharge Summary  Patient Name: Kieran Quiroz  Date: 11/12/2019  Referral Diagnosis:   Referring provider: Hussein Feliciano MD  Visit Diagnosis:   1. Fibroma of right foot     2. Plantar fasciitis     3. Unsteady gait     4. Limited joint range of motion (ROM)         Goals:  Pt. will demonstrate/verbalize independence in self-management of condition in : 12 weeks Progressing   Pt. will be independent with home exercise program in : 6 weeks MET (modifiend independent)   Pt. will report decreased  intensity, frequency of : in 12 weeks;Comment  Comment:: in right foot Progressing  Pt. will have improved quality of sleep: with less pain;Comment MET no throbbing at night   Comment:: in right foot while laying prone  - up and down but increased medication   Pt. will be able to walk : 10 minutes;with less pain;with less difficulty;for household mobility;in 12 weeks - walked 4 blocks and had increased pain - iced and elevated   Patient will stand : 10 minutes;with less pain;with less difficultty;in 12 weeks MET able to stand 15-20 minutes    Patient was seen for 14 visits from 5/10/19 to 10/11/19 with 0  missed appointments.  Pt improved initially after first 6-8 visits but did not continue to improve.  Continues to have pain with weightbearing.     Therapy will be discontinued at this time.  The patient will need a new referral to resume.    Thank you for your referral.  Sharon Zhu, PT, DPT   11/12/2019  1:37 PM

## 2021-06-03 VITALS — WEIGHT: 213 LBS | HEIGHT: 74 IN | BODY MASS INDEX: 27.34 KG/M2

## 2021-06-16 NOTE — TELEPHONE ENCOUNTER
Telephone Encounter by Nilsa Harris at 4/25/2019  9:07 AM     Author: Nilsa Harris Service: -- Author Type: --    Filed: 4/25/2019  9:08 AM Encounter Date: 4/24/2019 Status: Signed    : Nilsa Harris       PRIOR AUTHORIZATION DENIED    Denial Rational:       Appeal Information: If provider would like to appeal please provide a letter of medical necessity stating why formulary alternatives would not be clinically appropriate for the patient and route back to the PA team.

## 2021-06-17 NOTE — PATIENT INSTRUCTIONS - HE
Patient Instructions by Nelida Dejesus PA-C at 4/23/2019  8:25 AM     Author: Nelida Dejesus PA-C Service:  Author Type: Physician Assistant    Filed: 4/29/2019  2:03 PM Date of Service: 4/23/2019  8:25 AM Status: Addendum    : Nelida Dejesus PA-C (Physician Assistant)    Related Notes: Original Note by Nelida Dejesus PA-C (Physician Assistant) filed at 4/29/2019  2:02 PM         Plan recommended today:    Follow up as needed with Nelida; Please speak with Zamzam Mejia at your current pain program to see about managing your foot pain as well so that you are not seeing multiple providers.  She may get my consult note from today if that helps or call me.    Sign a Release of Information for [facility] office notes and EMG so we can obtain your records for our next visit    Continue your current regimen of alleviating factors as reviewed today    Please see your current provider for any refill of an opioid prescription; Please discuss any health concerns with your primary care physician.    Behavioral Therapy- continue every 2-3 weeks with [facility]    Physical and Occupational Therapy- referral to [facility] beds at the [facility] for scar and desensitization therapies.      PM&R- Continue with Dr. Cardenas (please discuss with Steph Palm about increasing Gabapentin dose to help cover foot pain better and improve sleep at night.)    Injections- none recommended at this time.  May consider trigger point injection around the incision/site of pain     Non-opoid medical management includes- discussed increasing Gabapentin dose.  First, will trial topical compounded cream to apply to bottom of foot 3 times a day as needed for pain.  This prescription will be sent to [facility]     Education was provided today in regards to the patient's specific diagnosis

## 2021-06-17 NOTE — PATIENT INSTRUCTIONS - HE
Patient Instructions by Sharon Zhu PT at 5/17/2019  1:30 PM     Author: Sharon Zhu PT Service: -- Author Type: Physical Therapist    Filed: 5/17/2019  1:46 PM Encounter Date: 5/17/2019 Status: Signed    : Sharon Zhu PT (Physical Therapist)        RESISTED EVERSION    Using an elastic band attached to your foot, hook it under your opposite foot and up to your hand.     Next, draw the band outwards to the side.     Be sure to keep your heel in contact with the floor the entire time.      RESISTED INVERSION    While seated, cross your legs and using an elastic band attached to your foot, hook it under your opposite foot and up to your hand.     Next, draw your foot inward.     Be sure to keep your heel in contact with the floor the entire time.      RESISTED PLANTARFLEXION    While seated, use an elastic band attached to your foot and press your foot downward and forward.     Be sure to keep your heel in contact with the floor the entire time.      RESISTED DORSIFLEXION    Using an elastic band attached to your target foot, hook it under your opposite foot and up to your hand.     Next, draw the band upwards with the target foot as shown.      Be sure to keep your heel in contact with the floor the entire time.

## 2021-06-17 NOTE — PATIENT INSTRUCTIONS - HE
Patient Instructions by Sharon Zhu PT at 8/9/2019  1:30 PM     Author: Sharon Zhu PT Service: -- Author Type: Physical Therapist    Filed: 8/9/2019  1:50 PM Encounter Date: 8/9/2019 Status: Signed    : Sharon Zhu PT (Physical Therapist)        Toe Series - Flexion/Extension    Sit with knee stacked above ankle    1) Flexion - Curl toes down toward floor  2) Extension - Lift toes up toward ceiling    x10      1st RAY EXTENSION - GREAT TOE EXTENSION    With your foot fully planted on the floor, raise your 1st one (the Great Toe) without raising any other toes. Your heel, 2nd thru 5th toes and the base of the Great Toe should maintain contact to the floor at all times.     x10      2nd-5th TOE EXTENSION - LITTLE TOES EXTENSION    With your foot fully planted on the floor, raise your 2nd through 5th toes without raising the 1st one (the Great Toe). Your heel and your Great Toe should maintain contact to the floor at all times.     x10         Place feet together.  Lift R great toe and L 2nd-5th toes and alternate     x10       Toe Series - Abduction/Adduction    Sit with knee stacked above ankle    1) Abduction - Spread toes as far apart from each other as possible. The toes may lift off the floor.  2) Adduction - Push toes together    x10      ARCH LIFTS in sitting, then standing     Start with your foot on the floor. Raise up the arch of your foot while maintaining your big toe, ball of your foot and heel on the floor the entire time.     Hold 10 sec x5           Arch    Start the exercise in sitting then progress to standing. Pretend there is a pencil underneath your arch and try to lift your arch off the pencil and create a dome. Do not curl your toes but instead shorten your foot muscles.     Hold 10 sec x5

## 2021-06-17 NOTE — PATIENT INSTRUCTIONS - HE
Patient Instructions by Sharon Zhu PT at 8/16/2019  1:00 PM     Author: Sharon Zhu PT Service: -- Author Type: Physical Therapist    Filed: 8/16/2019 12:58 PM Encounter Date: 8/16/2019 Status: Signed    : Sharon Zhu PT (Physical Therapist)           TOWEL SCRUNCH    Sit with a towel on a smooth surface. Place your foot on the towel. Using your toes, scrunch the towel up. Unfold fold the towel and repeat the process.        Toe Series - Flexion/Extension    Sit with knee stacked above ankle    1) Flexion - Curl toes down toward floor  2) Extension - Lift toes up toward ceiling    x10      1st RAY EXTENSION - GREAT TOE EXTENSION    With your foot fully planted on the floor, raise your 1st one (the Great Toe) without raising any other toes. Your heel, 2nd thru 5th toes and the base of the Great Toe should maintain contact to the floor at all times.     x10      2nd-5th TOE EXTENSION - LITTLE TOES EXTENSION    With your foot fully planted on the floor, raise your 2nd through 5th toes without raising the 1st one (the Great Toe). Your heel and your Great Toe should maintain contact to the floor at all times.     x10         Toe Series - Abduction/Adduction    Sit with knee stacked above ankle    1) Abduction - Spread toes as far apart from each other as possible. The toes may lift off the floor.  2) Adduction - Push toes together    x10

## 2021-06-17 NOTE — PATIENT INSTRUCTIONS - HE
Patient Instructions by Sharon Zhu PT at 6/14/2019  1:30 PM     Author: Sharon Zhu PT Service: -- Author Type: Physical Therapist    Filed: 6/14/2019  1:56 PM Encounter Date: 6/14/2019 Status: Signed    : hSaron Zhu PT (Physical Therapist)        Gastroc Stair Stretch    Stand with the middle of your foot on the edge of the stairs while holding onto the railing. Slowly drop heels off until you feel a stretch in the back of your legs keeping your knees straight.

## 2021-06-17 NOTE — PATIENT INSTRUCTIONS - HE
Patient Instructions by Sharon Zhu PT at 5/10/2019 11:30 AM     Author: Sharon Zhu PT Service: -- Author Type: Physical Therapist    Filed: 5/10/2019 12:11 PM Encounter Date: 5/10/2019 Status: Signed    : Sharon Zhu PT (Physical Therapist)        ANKLE PUMPS    Pump ankle up and down.      ANKLE CIRCLES    Move your ankle in a circular pattern one direction for several repetitions and then reverse the direction.       ANKLE ALPHABET    While in a seated position, write out the alphabet in the air with your big toe.    Your ankle should be moving as you perform this.              RESISTED PLANTARFLEXION    While seated, use an elastic band attached to your foot and press your foot downward and forward.     Be sure to keep your heel in contact with the floor the entire time.                           PLANTAR FASCIITIS - FOOT MUSCLE STRETCH    Sit and cross your affected foot over your other knee. With your hand, stretch the toes back and hold. Remember. Do not stretch into pain. Hold and repeat.

## 2021-06-21 NOTE — ANESTHESIA POSTPROCEDURE EVALUATION
Patient: Kieran Quiroz  EXCISION FIBROMA RIGHT FOOT, plantar  Anesthesia type: general    Patient location: Phase II Recovery  Last vitals:   Vitals:    10/18/18 1400   BP: (!) 155/101   Pulse: 80   Resp:    Temp:    SpO2: 96%     Post vital signs: stable  Level of consciousness: awake and responds to simple questions  Post-anesthesia pain: pain needs to be addressed;  Pt with severe pain at operative site despite clinical numbness pre and post-incision in the tibial n distribution (covering where surgery site). Remains numb throughout foot, but describing 10/10 pain localized to surgical incision. Pain improving with IV fentanyl and toradol.   Post-anesthesia nausea and vomiting: no  Pulmonary: unassisted, return to baseline  Cardiovascular: stable and blood pressure at baseline  Hydration: adequate  Anesthetic events: no    QCDR Measures:  ASA# 11 - Ute-op Cardiac Arrest: ASA11B - Patient did NOT experience unanticipated cardiac arrest  ASA# 12 - Ute-op Mortality Rate: ASA12B - Patient did NOT die  ASA# 13 - PACU Re-Intubation Rate: NA - No ETT / LMA used for case  ASA# 10 - Composite Anes Safety: ASA10A - No serious adverse event    Additional Notes:

## 2021-06-21 NOTE — ANESTHESIA CARE TRANSFER NOTE
Last vitals:   Vitals:    10/18/18 1341   BP: (!) (P) 138/96   Pulse: (P) 81   Resp: (P) 20   Temp: (P) 36.6  C (97.8  F)   SpO2: (P) 100%     Pt brought to phase 2 on 10L facemask. Monitors applied. VSS upon arrival.    Patient's level of consciousness is drowsy  Spontaneous respirations: yes  Maintains airway independently: yes  Dentition unchanged: yes  Oropharynx: oropharynx clear of all foreign objects    QCDR Measures:  ASA# 20 - Surgical Safety Checklist: WHO surgical safety checklist completed prior to induction  PQRS# 430 - Adult PONV Prevention: 4558F - Pt received => 2 anti-emetic agents (different classes) preop & intraop  ASA# 8 - Peds PONV Prevention: NA - Not pediatric patient, not GA or 2 or more risk factors NOT present  PQRS# 424 - Ute-op Temp Management: NA - MAC anesthesia or case < 60 minutes  PQRS# 426 - PACU Transfer Protocol: - Transfer of care checklist used  ASA# 14 - Acute Post-op Pain: ASA14A - Patient experienced pain >= 7 out of 10

## 2021-06-21 NOTE — ANESTHESIA PREPROCEDURE EVALUATION
Anesthesia Evaluation      No history of anesthetic complications     Airway   Mallampati: II  Neck ROM: full   Pulmonary     breath sounds clear to auscultation  (+) sleep apnea,                          Cardiovascular   Exercise tolerance: > or = 4 METS  (+) hypertension, ,     (-) CAD, angina  Rhythm: regular  Rate: normal,         Neuro/Psych    (+) depression (Risperdal, Zoloft, Depakote), anxiety/panic attacks, chronic pain (R foot 2/2 fibroma - meds include Perocet, Gabapentin 300mg, Flexeril)    Endo/Other - negative ROS      GI/Hepatic/Renal - negative ROS   (+) GERD well controlled,       Comments: BPH            Dental    (+) caps                       Anesthesia Plan  Planned anesthetic: MAC  Popliteal block in pre-op for post op pain per sureon request  General mask - Ketamine IV + propofol infusion   Decadron 10, Zofran 4 for antiemesis   ASA 3   Induction: intravenous   Anesthetic plan and risks discussed with: patient  Anesthesia plan special considerations: antiemetics,   Post-op plan: routine recovery

## 2021-06-21 NOTE — ANESTHESIA PROCEDURE NOTES
Peripheral Block    Patient location during procedure: pre-op  Start time: 10/18/2018 12:39 PM  End time: 10/18/2018 12:45 PM  post-op analgesia per surgeon order as noted in medical record  Staffing:  Performing  Anesthesiologist: JAMAAL GRANT  Preanesthetic Checklist  Completed: patient identified, site marked, risks, benefits, and alternatives discussed, timeout performed, consent obtained, airway assessed, oxygen available, suction available, emergency drugs available and hand hygiene performed  Peripheral Block  Block type: sciatic, popliteal  Prep: ChloraPrep  Patient position: supine  Patient monitoring: cardiac monitor, continuous pulse oximetry, heart rate and blood pressure  Laterality: right  Injection technique: ultrasound guided    Ultrasound used to visualize needle placement in proximity to nerve being blocked: yes   Permanent ultrasound image captured for medical record  Sterile gel and probe cover used for ultrasound.    Needle  Needle type: Stimuplex   Needle gauge: 21 G  Needle length: 6 in  no peripheral nerve catheter placed  Assessment  Injection assessment: no difficulty with injection, negative aspiration for heme, no paresthesia on injection and incremental injection
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
## 2021-09-17 ENCOUNTER — LAB REQUISITION (OUTPATIENT)
Dept: LAB | Facility: CLINIC | Age: 51
End: 2021-09-17
Payer: COMMERCIAL

## 2021-09-17 DIAGNOSIS — Z13.220 ENCOUNTER FOR SCREENING FOR LIPOID DISORDERS: ICD-10-CM

## 2021-09-17 DIAGNOSIS — I10 ESSENTIAL (PRIMARY) HYPERTENSION: ICD-10-CM

## 2021-09-17 LAB
ANION GAP SERPL CALCULATED.3IONS-SCNC: 11 MMOL/L (ref 5–18)
BUN SERPL-MCNC: 11 MG/DL (ref 8–22)
CALCIUM SERPL-MCNC: 9.4 MG/DL (ref 8.5–10.5)
CHLORIDE BLD-SCNC: 106 MMOL/L (ref 98–107)
CHOLEST SERPL-MCNC: 211 MG/DL
CO2 SERPL-SCNC: 26 MMOL/L (ref 22–31)
CREAT SERPL-MCNC: 0.99 MG/DL (ref 0.7–1.3)
GFR SERPL CREATININE-BSD FRML MDRD: 88 ML/MIN/1.73M2
GLUCOSE BLD-MCNC: 118 MG/DL (ref 70–125)
HDLC SERPL-MCNC: 61 MG/DL
LDLC SERPL CALC-MCNC: 108 MG/DL
POTASSIUM BLD-SCNC: 4.2 MMOL/L (ref 3.5–5)
SODIUM SERPL-SCNC: 143 MMOL/L (ref 136–145)
TRIGL SERPL-MCNC: 212 MG/DL

## 2021-09-17 PROCEDURE — 36415 COLL VENOUS BLD VENIPUNCTURE: CPT | Mod: ORL | Performed by: PHYSICIAN ASSISTANT

## 2021-09-17 PROCEDURE — 80048 BASIC METABOLIC PNL TOTAL CA: CPT | Mod: ORL | Performed by: PHYSICIAN ASSISTANT

## 2021-09-17 PROCEDURE — 80061 LIPID PANEL: CPT | Mod: ORL | Performed by: PHYSICIAN ASSISTANT

## 2022-09-23 ENCOUNTER — LAB REQUISITION (OUTPATIENT)
Dept: LAB | Facility: CLINIC | Age: 52
End: 2022-09-23
Payer: COMMERCIAL

## 2022-09-23 DIAGNOSIS — I10 ESSENTIAL (PRIMARY) HYPERTENSION: ICD-10-CM

## 2022-09-23 DIAGNOSIS — F25.0 SCHIZOAFFECTIVE DISORDER, BIPOLAR TYPE (H): ICD-10-CM

## 2022-09-23 DIAGNOSIS — Z13.220 ENCOUNTER FOR SCREENING FOR LIPOID DISORDERS: ICD-10-CM

## 2022-09-23 LAB
ANION GAP SERPL CALCULATED.3IONS-SCNC: 11 MMOL/L (ref 7–15)
BUN SERPL-MCNC: 15.4 MG/DL (ref 6–20)
CALCIUM SERPL-MCNC: 9.3 MG/DL (ref 8.6–10)
CHLORIDE SERPL-SCNC: 103 MMOL/L (ref 98–107)
CHOLEST SERPL-MCNC: 181 MG/DL
CREAT SERPL-MCNC: 1.01 MG/DL (ref 0.67–1.17)
DEPRECATED HCO3 PLAS-SCNC: 26 MMOL/L (ref 22–29)
GFR SERPL CREATININE-BSD FRML MDRD: 89 ML/MIN/1.73M2
GLUCOSE SERPL-MCNC: 122 MG/DL (ref 70–99)
HDLC SERPL-MCNC: 53 MG/DL
LDLC SERPL CALC-MCNC: 118 MG/DL
NONHDLC SERPL-MCNC: 128 MG/DL
POTASSIUM SERPL-SCNC: 4.4 MMOL/L (ref 3.4–5.3)
SODIUM SERPL-SCNC: 140 MMOL/L (ref 136–145)
TRIGL SERPL-MCNC: 49 MG/DL
VALPROATE SERPL-MCNC: 56.6 UG/ML

## 2022-09-23 PROCEDURE — 80048 BASIC METABOLIC PNL TOTAL CA: CPT | Mod: ORL | Performed by: PHYSICIAN ASSISTANT

## 2022-09-23 PROCEDURE — 80061 LIPID PANEL: CPT | Mod: ORL | Performed by: PHYSICIAN ASSISTANT

## 2022-09-23 PROCEDURE — 80164 ASSAY DIPROPYLACETIC ACD TOT: CPT | Mod: ORL | Performed by: PHYSICIAN ASSISTANT

## 2023-09-18 ENCOUNTER — LAB REQUISITION (OUTPATIENT)
Dept: LAB | Facility: CLINIC | Age: 53
End: 2023-09-18
Payer: COMMERCIAL

## 2023-09-18 DIAGNOSIS — I10 ESSENTIAL (PRIMARY) HYPERTENSION: ICD-10-CM

## 2023-09-18 DIAGNOSIS — Z13.220 ENCOUNTER FOR SCREENING FOR LIPOID DISORDERS: ICD-10-CM

## 2023-09-18 LAB
ANION GAP SERPL CALCULATED.3IONS-SCNC: 11 MMOL/L (ref 7–15)
BUN SERPL-MCNC: 11.1 MG/DL (ref 6–20)
CALCIUM SERPL-MCNC: 8.7 MG/DL (ref 8.6–10)
CHLORIDE SERPL-SCNC: 106 MMOL/L (ref 98–107)
CHOLEST SERPL-MCNC: 212 MG/DL
CREAT SERPL-MCNC: 1.27 MG/DL (ref 0.67–1.17)
DEPRECATED HCO3 PLAS-SCNC: 23 MMOL/L (ref 22–29)
EGFRCR SERPLBLD CKD-EPI 2021: 68 ML/MIN/1.73M2
GLUCOSE SERPL-MCNC: 116 MG/DL (ref 70–99)
HDLC SERPL-MCNC: 55 MG/DL
LDLC SERPL CALC-MCNC: 125 MG/DL
NONHDLC SERPL-MCNC: 157 MG/DL
POTASSIUM SERPL-SCNC: 3.8 MMOL/L (ref 3.4–5.3)
SODIUM SERPL-SCNC: 140 MMOL/L (ref 136–145)
TRIGL SERPL-MCNC: 160 MG/DL

## 2023-09-18 PROCEDURE — 80048 BASIC METABOLIC PNL TOTAL CA: CPT | Mod: ORL | Performed by: PHYSICIAN ASSISTANT

## 2023-09-18 PROCEDURE — 80061 LIPID PANEL: CPT | Mod: ORL | Performed by: PHYSICIAN ASSISTANT

## 2023-10-19 ENCOUNTER — ANESTHESIA EVENT (OUTPATIENT)
Dept: SURGERY | Facility: AMBULATORY SURGERY CENTER | Age: 53
End: 2023-10-19
Payer: COMMERCIAL

## 2023-10-20 ENCOUNTER — HOSPITAL ENCOUNTER (OUTPATIENT)
Facility: AMBULATORY SURGERY CENTER | Age: 53
Discharge: HOME | End: 2023-10-20
Attending: PODIATRIST
Payer: COMMERCIAL

## 2023-10-20 ENCOUNTER — ANESTHESIA (OUTPATIENT)
Dept: SURGERY | Facility: AMBULATORY SURGERY CENTER | Age: 53
End: 2023-10-20
Payer: COMMERCIAL

## 2023-10-20 VITALS
TEMPERATURE: 97.7 F | HEART RATE: 76 BPM | DIASTOLIC BLOOD PRESSURE: 90 MMHG | HEIGHT: 73 IN | OXYGEN SATURATION: 98 % | BODY MASS INDEX: 26.24 KG/M2 | WEIGHT: 198 LBS | SYSTOLIC BLOOD PRESSURE: 136 MMHG | RESPIRATION RATE: 16 BRPM

## 2023-10-20 DIAGNOSIS — M24.575 CONTRACTURE OF JOINT OF LEFT FOOT: ICD-10-CM

## 2023-10-20 RX ORDER — OXYCODONE HYDROCHLORIDE 5 MG/1
5 TABLET ORAL
Status: DISCONTINUED | OUTPATIENT
Start: 2023-10-20 | End: 2023-10-21 | Stop reason: HOSPADM

## 2023-10-20 RX ORDER — PROPOFOL 10 MG/ML
INJECTION, EMULSION INTRAVENOUS CONTINUOUS PRN
Status: DISCONTINUED | OUTPATIENT
Start: 2023-10-20 | End: 2023-10-20

## 2023-10-20 RX ORDER — FENTANYL CITRATE 0.05 MG/ML
25 INJECTION, SOLUTION INTRAMUSCULAR; INTRAVENOUS
Status: DISCONTINUED | OUTPATIENT
Start: 2023-10-20 | End: 2023-10-21 | Stop reason: HOSPADM

## 2023-10-20 RX ORDER — CEFAZOLIN SODIUM 2 G/100ML
2 INJECTION, SOLUTION INTRAVENOUS SEE ADMIN INSTRUCTIONS
Status: DISCONTINUED | OUTPATIENT
Start: 2023-10-20 | End: 2023-10-21 | Stop reason: HOSPADM

## 2023-10-20 RX ORDER — ACETAMINOPHEN 325 MG/1
975 TABLET ORAL ONCE
Status: DISCONTINUED | OUTPATIENT
Start: 2023-10-20 | End: 2023-10-21 | Stop reason: HOSPADM

## 2023-10-20 RX ORDER — ONDANSETRON 2 MG/ML
INJECTION INTRAMUSCULAR; INTRAVENOUS PRN
Status: DISCONTINUED | OUTPATIENT
Start: 2023-10-20 | End: 2023-10-20

## 2023-10-20 RX ORDER — PROPOFOL 10 MG/ML
INJECTION, EMULSION INTRAVENOUS PRN
Status: DISCONTINUED | OUTPATIENT
Start: 2023-10-20 | End: 2023-10-20

## 2023-10-20 RX ORDER — OXYCODONE HYDROCHLORIDE 10 MG/1
10 TABLET ORAL
Status: DISCONTINUED | OUTPATIENT
Start: 2023-10-20 | End: 2023-10-21 | Stop reason: HOSPADM

## 2023-10-20 RX ORDER — ONDANSETRON 4 MG/1
4 TABLET, ORALLY DISINTEGRATING ORAL EVERY 30 MIN PRN
Status: DISCONTINUED | OUTPATIENT
Start: 2023-10-20 | End: 2023-10-21 | Stop reason: HOSPADM

## 2023-10-20 RX ORDER — LIDOCAINE 40 MG/G
CREAM TOPICAL
Status: DISCONTINUED | OUTPATIENT
Start: 2023-10-20 | End: 2023-10-21 | Stop reason: HOSPADM

## 2023-10-20 RX ORDER — SODIUM CHLORIDE, SODIUM LACTATE, POTASSIUM CHLORIDE, CALCIUM CHLORIDE 600; 310; 30; 20 MG/100ML; MG/100ML; MG/100ML; MG/100ML
INJECTION, SOLUTION INTRAVENOUS CONTINUOUS
Status: DISCONTINUED | OUTPATIENT
Start: 2023-10-20 | End: 2023-10-21 | Stop reason: HOSPADM

## 2023-10-20 RX ORDER — GLYCOPYRROLATE 0.2 MG/ML
INJECTION, SOLUTION INTRAMUSCULAR; INTRAVENOUS PRN
Status: DISCONTINUED | OUTPATIENT
Start: 2023-10-20 | End: 2023-10-20

## 2023-10-20 RX ORDER — LIDOCAINE HYDROCHLORIDE 20 MG/ML
INJECTION, SOLUTION INFILTRATION; PERINEURAL PRN
Status: DISCONTINUED | OUTPATIENT
Start: 2023-10-20 | End: 2023-10-20

## 2023-10-20 RX ORDER — FENTANYL CITRATE 50 UG/ML
INJECTION, SOLUTION INTRAMUSCULAR; INTRAVENOUS PRN
Status: DISCONTINUED | OUTPATIENT
Start: 2023-10-20 | End: 2023-10-20

## 2023-10-20 RX ORDER — DEXAMETHASONE SODIUM PHOSPHATE 4 MG/ML
INJECTION, SOLUTION INTRA-ARTICULAR; INTRALESIONAL; INTRAMUSCULAR; INTRAVENOUS; SOFT TISSUE PRN
Status: DISCONTINUED | OUTPATIENT
Start: 2023-10-20 | End: 2023-10-20

## 2023-10-20 RX ORDER — ONDANSETRON 2 MG/ML
4 INJECTION INTRAMUSCULAR; INTRAVENOUS EVERY 30 MIN PRN
Status: DISCONTINUED | OUTPATIENT
Start: 2023-10-20 | End: 2023-10-21 | Stop reason: HOSPADM

## 2023-10-20 RX ORDER — CEFAZOLIN SODIUM 2 G/100ML
2 INJECTION, SOLUTION INTRAVENOUS
Status: COMPLETED | OUTPATIENT
Start: 2023-10-20 | End: 2023-10-20

## 2023-10-20 RX ADMIN — SODIUM CHLORIDE, SODIUM LACTATE, POTASSIUM CHLORIDE, CALCIUM CHLORIDE: 600; 310; 30; 20 INJECTION, SOLUTION INTRAVENOUS at 12:23

## 2023-10-20 RX ADMIN — ONDANSETRON 4 MG: 2 INJECTION INTRAMUSCULAR; INTRAVENOUS at 12:50

## 2023-10-20 RX ADMIN — DEXAMETHASONE SODIUM PHOSPHATE 4 MG: 4 INJECTION, SOLUTION INTRA-ARTICULAR; INTRALESIONAL; INTRAMUSCULAR; INTRAVENOUS; SOFT TISSUE at 12:50

## 2023-10-20 RX ADMIN — LIDOCAINE HYDROCHLORIDE 50 MG: 20 INJECTION, SOLUTION INFILTRATION; PERINEURAL at 12:50

## 2023-10-20 RX ADMIN — FENTANYL CITRATE 25 MCG: 50 INJECTION, SOLUTION INTRAMUSCULAR; INTRAVENOUS at 13:06

## 2023-10-20 RX ADMIN — GLYCOPYRROLATE 0.2 MG: 0.2 INJECTION, SOLUTION INTRAMUSCULAR; INTRAVENOUS at 12:50

## 2023-10-20 RX ADMIN — PROPOFOL 200 MCG/KG/MIN: 10 INJECTION, EMULSION INTRAVENOUS at 12:52

## 2023-10-20 RX ADMIN — PROPOFOL 30 MG: 10 INJECTION, EMULSION INTRAVENOUS at 12:52

## 2023-10-20 RX ADMIN — FENTANYL CITRATE 25 MCG: 50 INJECTION, SOLUTION INTRAMUSCULAR; INTRAVENOUS at 13:11

## 2023-10-20 RX ADMIN — CEFAZOLIN SODIUM 2 G: 2 INJECTION, SOLUTION INTRAVENOUS at 12:50

## 2023-10-20 ASSESSMENT — LIFESTYLE VARIABLES: TOBACCO_USE: 1

## 2023-10-20 NOTE — DISCHARGE INSTRUCTIONS
If you have any questions or concerns regarding your procedure, please contact Dr. Kamara, his office number is [phone].     Discharge Instructions for Foot Surgery    Arrange to have an adult drive you home after surgery. If you had general anesthesia, it may take a day or more to fully recover. So for at least the next 24 hours:  Do not drive or use machinery or power tools.  Do not drink alcohol.  Do not make any major decisions.     Diet    Here are some dietary suggestions following surgery:   Start with liquids and light foods (like dry toast, bananas, and applesauce). As you feel up to it, slowly return to your normal diet.  Drink at least 6 to 8 glasses of water or other nonalcoholic fluids a day.  To avoid nausea, eat before taking narcotic pain medicines.     Medicines    It is important to follow these directions:   Take all medicines as instructed.  Take pain medicines on time. Do not wait until the pain is bad before taking your medicines.  Avoid alcohol while on pain medicines.     Activity    These instructions are to help with your recovery:   Sit or lie down when possible. Put a pillow or 2 under your heel to raise your foot above the level of your heart.  Wrap an ice pack or bag of frozen peas in a thin cloth. Place it over your bandaged foot for no longer than 20 minutes. Do this 3 times a day.  You can drive again in 7 days or as instructed by your healthcare provider.  Wear your surgical shoe at all times unless told otherwise by your healthcare provider.  Use crutches or a cane as directed.  Follow your healthcare provider s instructions about putting weight on your foot.     Bandage and cast care    Here are tips to follow:   Do not shower for 48 hours.  When you can shower again, cover the bandage, splint, or cast with a plastic bag to keep it dry.  Don t remove your bandage until your healthcare provider tells you to. If your bandage gets wet or dirty, check with your healthcare  provider. You can likely replace it with a clean, dry one.     What to expect    It is normal to have the following:  Bruising and slight swelling of the foot and toes  A small amount of blood on the dressing     Call your healthcare provider     Contact your healthcare provider right away if you have any of the following:   Continuous bleeding through the bandage  Excessive swelling, increased bleeding, or redness  Fever over 101.5 F (38.6 C) or chills  Pain unrelieved by pain medicines  Foot feels cold to the touch or numb  Increased pain in your leg or foot  Chest pain or shortness of breath    Coping with pain    *Pain after surgery is normal and expected*    If you have pain after surgery, pain medicine will help you feel better. Take it as told, before pain becomes severe. Also, ask your healthcare provider or pharmacist about other ways to control pain. This might be with heat, ice, or relaxation. And follow any other instructions your surgeon or nurse gives you.    Tips for taking pain medicine    To get the best relief possible, remember these points:    Pain medicines can upset your stomach. Taking them with a little food may help.  Most pain relievers taken by mouth need at least 20 to 30 minutes to start to work.  Don't wait till your pain becomes severe before you take your medicine. Try to time your medicine so that you can take it before starting an activity. This might be before you get dressed, go for a walk, or sit down for dinner.  Constipation is a common side effect of pain medicines. You can take medicines such as laxatives (Miralax) or stool softeners to help ease constipation. Drinking lots of fluids and eating foods such as fruits and vegetables that are high in fiber can also help.  Drinking alcohol and taking pain medicine can cause dizziness and slow your breathing. It can even be deadly. Don't drink alcohol while taking pain medicine.  Pain medicine can make you react more slowly to  things. Don't drive or run machinery while taking pain medicine.  Your healthcare provider may tell you to take acetaminophen to help ease your pain. Ask him or her how much you are supposed to take each day. Acetaminophen or other pain relievers may interact with your prescription medicines or other over-the-counter (OTC) medicines. Some prescription medicines have acetaminophen and other ingredients. Using both prescription and OTC acetaminophen for pain can cause you to overdose. Read the labels on your OTC medicines with care. This will help you to clearly know the list of ingredients, how much to take, and any warnings. It may also help you not take too much acetaminophen. If you have questions or don't understand the information, ask your pharmacist or healthcare provider to explain it to you before you take the OTC medicine.    Discharge Instructions: After Your Surgery, regarding Anesthesia    You ve just had surgery. During surgery, you were given medicine called anesthesia to keep you relaxed and free of pain. After surgery, you may have some pain or nausea. This is common. Here are some tips for feeling better and getting well after surgery.    Going home    Your healthcare provider will show you how to take care of yourself when you go home. He or she will also answer your questions. Have an adult family member or friend drive you home. For the first 24 hours after your surgery:    Don't drive or use heavy equipment.  Don't make important decisions or sign legal papers.  Don't drink alcohol.  Have an adult stay with you for the next 24 hours. He or she can watch for problems and help keep you safe.  Be sure to go to all follow-up visits with your healthcare provider. And rest after your surgery for as long as your healthcare provider tells you to.    Managing nausea    Some people have an upset stomach after surgery. This is often because of anesthesia, pain, or pain medicine, or the stress of surgery.  These tips will help you handle nausea and eat healthy foods as you get better. If you were on a special food plan before surgery, ask your healthcare provider if you should follow it while you get better. These tips may help:    Don't push yourself to eat. Your body will tell you when to eat and how much.  Start off with clear liquids and soup. They are easier to digest.  Next try semi-solid foods, such as mashed potatoes, applesauce, and gelatin, as you feel ready.  Slowly move to solid foods. Don t eat fatty, rich, or spicy foods at first.  Don't force yourself to have 3 large meals a day. Instead eat smaller amounts more often.  Take pain medicines with a small amount of solid food, such as crackers or toast, to prevent nausea.    If you have obstructive sleep apnea    You were given anesthesia medicine during surgery to keep you comfortable and free of pain. After surgery, you may have more apnea spells because of this medicine and other medicines you were given. The spells may last longer than usual.   At home:    Keep using the continuous positive airway pressure (CPAP) device when you sleep. Unless your healthcare provider tells you not to, use it when you sleep, day or night. CPAP is a common device used to treat obstructive sleep apnea.  Talk with your provider before taking any pain medicine, muscle relaxants, or sedatives. Your provider will tell you about the possible dangers of taking these medicines.

## 2023-10-20 NOTE — PROGRESS NOTES
Pt and family verbalize good understanding of discharge teach and follow up with MD.   VSS,Surgical incision CDI. D/C criteria met. Pt verbalizes readiness to go home.   MD at bedside reviewing discharge instructions with pt and step daughter. Pt home with post op shoe and dressing on.     @ME2@ 10/20/2023 2:12 PM

## 2023-10-20 NOTE — ANESTHESIA POSTPROCEDURE EVALUATION
Patient: Kieran Quiroz    Procedure: Procedure(s):  EXCISION PLANTAR FIBROMA LEFT       Anesthesia Type:  MAC    Note:  Disposition: Outpatient   Postop Pain Control: Uneventful            Sign Out: Well controlled pain   PONV: No   Neuro/Psych: Uneventful            Sign Out: Acceptable/Baseline neuro status   Airway/Respiratory: Uneventful            Sign Out: Acceptable/Baseline resp. status   CV/Hemodynamics: Uneventful            Sign Out: Acceptable CV status; No obvious hypovolemia; No obvious fluid overload   Other NRE: NONE   DID A NON-ROUTINE EVENT OCCUR? No           Last vitals:  Vitals Value Taken Time   /91 10/20/23 1402   Temp 97.7  F (36.5  C) 10/20/23 1347   Pulse 81 10/20/23 1405   Resp 16 10/20/23 1400   SpO2 97 % 10/20/23 1405   Vitals shown include unfiled device data.    Electronically Signed By: Roxanne Gonzalez MD  October 20, 2023  2:25 PM   (1) Outpatient Area

## 2023-10-20 NOTE — OP NOTE
[facility]    Operative Note    Pre-operative diagnosis: Plantar fibromatosis left foot   Post-operative diagnosis Same   Procedure: Procedure(s):  EXCISION PLANTAR FIBROMA LEFT   Surgeon: Pierre Kamara DPM   Assistants(s): None   Anesthesia: MAC    Estimated blood loss: 2.0ml    Drains: None   Specimens: ID Type Source Tests Collected by Time Destination   1 : Left foot fibroma Tissue Foot, Left SURGICAL PATHOLOGY EXAM Asad Pierre Núñez DPM 10/20/2023  1:14 PM        Findings: Firm fibrous mass of 3.0cm diameter x 5cm length invested into the medial band of the plantar fascia at mid-arch left.  Fascia distal and proximal to the mass appeared healthy and intact.  Musculature deep to the mass also appeared healthy.     Complications: None   Condition: Stable   Procedure Details: After obtaining the appropriate consents the patient was transported to the OR and placed supine on the OR table.  Anesthesia administered sedation and when it took effect the left foot was anesthetized with 7.0ml of a 1:1 mix of 0.5% marcaine plain with 2% xylocaine plain in the fashion of a posterior tibial nerve block.  Tourniquet was well padded and placed at the level of the malleoli on the left lower extremity.  The left foot was then prepped and draped in the usual aseptic fashion.  Appropriate time outs were taken.      Under sterile technique anesthesia was veified as adequate, the foot was exsanguinated with a meliza's bandage and the tourniquet was inflated to 250mmHg pressure.  Attention was directed to the central plantar arch where a 6.0cm linear incision was performed over the mass, dissection was carried via blunt dissection to the level of the medial plantar fascial band.  Superficial vessels were ligated or bovied as needed.  The mass was then dissected bluntly from underlying soft tissue attachments, sectioned proximally and distally and removed en toto.  No evidence of fibrosis was noted at the resection  margins, tissue deep to the mass appeared intact.  The removed tissue was sent to pathology in formalin.  The wound was flushed with sterile saline and inspected for additional signs of pathology, none were noted.  The wound was then closed with 2/0 vicryl to approximate the margins of the fascial defect, 3/0 vicryl in subcutaneous tissue and 3/0 nylon in skin.  2.0ml of Kenalog 10 was infiltrated to the wound site.  Dressing of adaptic, 4x4 gauze and rashard applied.  The tourniquet was released and vascular status noted to return left foot.      Patient tolerated anesthesia and procedure well, transported to PACU in stable condition.  Post-operatively the patient was dispensed a post-op shoe to be used at all times when ambulating.  He is to ice/elevate and maintain the dressing dry and intact until follow up at clinic in 1 week.  No pain medication prescribed as patient is being treated by pain clinic for chronic pain.         Pierre Kamara DPM

## 2023-10-20 NOTE — INTERVAL H&P NOTE
"I have reviewed the surgical (or preoperative) H&P that is linked to this encounter, and examined the patient. There are no significant changes    Clinical Conditions Present on Arrival:  Clinically Significant Risk Factors Present on Admission                  # Overweight: Estimated body mass index is 26.12 kg/m  as calculated from the following:    Height as of this encounter: 1.854 m (6' 1\").    Weight as of this encounter: 89.8 kg (198 lb).       "

## 2023-10-20 NOTE — ANESTHESIA CARE TRANSFER NOTE
Patient: Kieran Quiroz    Procedure: Procedure(s):  EXCISION PLANTAR FIBROMA LEFT       Diagnosis: Contracture of joint of left foot [M24.575]  Diagnosis Additional Information: No value filed.    Anesthesia Type:   MAC     Note:    Oropharynx: oropharynx clear of all foreign objects  Level of Consciousness: drowsy  Oxygen Supplementation: face mask  Level of Supplemental Oxygen (L/min / FiO2): 8  Independent Airway: airway patency satisfactory and stable  Dentition: dentition unchanged  Vital Signs Stable: post-procedure vital signs reviewed and stable  Report to RN Given: handoff report given  Patient transferred to: Phase II    Handoff Report: Identifed the Patient, Identified the Reponsible Provider, Reviewed the pertinent medical history, Discussed the surgical course, Reviewed Intra-OP anesthesia mangement and issues during anesthesia, Set expectations for post-procedure period and Allowed opportunity for questions and acknowledgement of understanding      Vitals:  Vitals Value Taken Time   /70    Temp 37C    Pulse 70    Resp 14    SpO2 100        Electronically Signed By: ROLA Daily CRNA  October 20, 2023  1:45 PM

## 2023-10-20 NOTE — ANESTHESIA PREPROCEDURE EVALUATION
Anesthesia Pre-Procedure Evaluation    Patient: Kieran Quiroz   MRN: [id number] : 1970        Procedure : Procedure(s):  EXCISION PLANTAR FIBROMA LEFT          Past Medical History:   Diagnosis Date    Hypertension     Other chronic pain     Sleep apnea       Past Surgical History:   Procedure Laterality Date    OTHER SURGICAL HISTORY Right     Fibroma right foot    RELEASE CARPAL TUNNEL      ZZHC PART EXCIS PLANTAR FASCIA Right 10/18/2018    Procedure: EXCISION FIBROMA RIGHT FOOT, plantar;  Surgeon: Ynes Huffman DPM;  Location: [facility];  Service: Podiatry      Allergies   Allergen Reactions    Sumatriptan Dizziness      Social History     Tobacco Use    Smoking status: Former     Types: Cigarettes     Quit date: 7/15/2017     Years since quittin.2    Smokeless tobacco: Current   Substance Use Topics    Alcohol use: No      Wt Readings from Last 1 Encounters:   10/17/23 89.8 kg (198 lb)        Anesthesia Evaluation   Pt has had prior anesthetic.     History of anesthetic complications       ROS/MED HX  ENT/Pulmonary:     (+) sleep apnea,               tobacco use, Past use,                      Neurologic:  - neg neurologic ROS     Cardiovascular:     (+)  hypertension- -   -  - -                                   (-) CAD   METS/Exercise Tolerance: >4 METS    Hematologic:  - neg hematologic  ROS     Musculoskeletal:       GI/Hepatic:  - neg GI/hepatic ROS     Renal/Genitourinary:  - neg Renal ROS     Endo:  - neg endo ROS     Psychiatric/Substance Use:     (+) psychiatric history anxiety and depression   Recreational drug usage: Cannabis.    Infectious Disease:  - neg infectious disease ROS     Malignancy:       Other: Comment:   Chronic headache     Neck pain     Physical deconditioning     Dizziness     Severe major depression with psychotic features, mood-incongruent (HRC)     Chronic post-traumatic stress disorder (HRC)     Social anxiety disorder (HRC)     MISHA (generalized  "anxiety disorder) (HRC)     Obsessive-compulsive disorder     H/O head injury     Substance abuse (HRC)     Alcohol dependence, binge pattern (HRC)     Intermittent explosive disorder     Substance induced mood disorder (HRC)     Encounter for long-term (current) use of medications     Schizoaffective disorder, bipolar type (HRC)     Substance abuse in remission (HRC)     Grief (HRC)     Unresolved grief (HRC)     Marijuana smoker, episodic     Adenomatous polyp of colon        (+)  , H/O Chronic Pain,         Physical Exam    Airway        Mallampati: II   TM distance: > 3 FB   Neck ROM: full   Mouth opening: > 3 cm    Respiratory Devices and Support         Dental       (+) Minor Abnormalities - some fillings, tiny chips      Cardiovascular          Rhythm and rate: regular     Pulmonary           breath sounds clear to auscultation           OUTSIDE LABS:  CBC: No results found for: \"WBC\", \"HGB\", \"HCT\", \"PLT\"  BMP:   Lab Results   Component Value Date     09/18/2023     09/23/2022    POTASSIUM 3.8 09/18/2023    POTASSIUM 4.4 09/23/2022    CHLORIDE 106 09/18/2023    CHLORIDE 103 09/23/2022    CO2 23 09/18/2023    CO2 26 09/23/2022    BUN 11.1 09/18/2023    BUN 15.4 09/23/2022    CR 1.27 (H) 09/18/2023    CR 1.01 09/23/2022     (H) 09/18/2023     (H) 09/23/2022     COAGS: No results found for: \"PTT\", \"INR\", \"FIBR\"  POC: No results found for: \"BGM\", \"HCG\", \"HCGS\"  HEPATIC:   Lab Results   Component Value Date    ALBUMIN 3.9 11/01/2019    PROTTOTAL 7.2 11/01/2019    ALT 41 11/01/2019    AST 28 11/01/2019    ALKPHOS 90 11/01/2019    BILITOTAL 0.3 11/01/2019     OTHER:   Lab Results   Component Value Date    SHANICE 8.7 09/18/2023       Anesthesia Plan    ASA Status:  2    NPO Status:  NPO Appropriate    Anesthesia Type: MAC.   Induction: N/a.   Maintenance: TIVA.        Consents    Anesthesia Plan(s) and associated risks, benefits, and realistic alternatives discussed. Questions answered and " patient/representative(s) expressed understanding.     - Discussed:     - Discussed with:  Patient            Postoperative Care    Pain management: Multi-modal analgesia.   PONV prophylaxis: Ondansetron (or other 5HT-3), Dexamethasone or Solumedrol     Comments:    Other Comments:     Propofol gtt  Robinul  Lidocaine  Ketamine  Zofran, decadron 4 mg  LMA prn  Toradol if ok with surgeon                      Roxanne Gonzalez MD

## 2024-11-06 ENCOUNTER — LAB REQUISITION (OUTPATIENT)
Dept: LAB | Facility: CLINIC | Age: 54
End: 2024-11-06
Payer: COMMERCIAL

## 2024-11-06 DIAGNOSIS — Z13.6 ENCOUNTER FOR SCREENING FOR CARDIOVASCULAR DISORDERS: ICD-10-CM

## 2024-11-06 DIAGNOSIS — Z12.5 ENCOUNTER FOR SCREENING FOR MALIGNANT NEOPLASM OF PROSTATE: ICD-10-CM

## 2024-11-06 DIAGNOSIS — I10 ESSENTIAL (PRIMARY) HYPERTENSION: ICD-10-CM

## 2024-11-06 LAB
ANION GAP SERPL CALCULATED.3IONS-SCNC: 11 MMOL/L (ref 7–15)
BUN SERPL-MCNC: 11.9 MG/DL (ref 6–20)
CALCIUM SERPL-MCNC: 9.1 MG/DL (ref 8.8–10.4)
CHLORIDE SERPL-SCNC: 105 MMOL/L (ref 98–107)
CHOLEST SERPL-MCNC: 240 MG/DL
CREAT SERPL-MCNC: 0.94 MG/DL (ref 0.67–1.17)
EGFRCR SERPLBLD CKD-EPI 2021: >90 ML/MIN/1.73M2
FASTING STATUS PATIENT QL REPORTED: ABNORMAL
FASTING STATUS PATIENT QL REPORTED: ABNORMAL
GLUCOSE SERPL-MCNC: 107 MG/DL (ref 70–99)
HCO3 SERPL-SCNC: 24 MMOL/L (ref 22–29)
HDLC SERPL-MCNC: 55 MG/DL
LDLC SERPL CALC-MCNC: 160 MG/DL
NONHDLC SERPL-MCNC: 185 MG/DL
POTASSIUM SERPL-SCNC: 3.9 MMOL/L (ref 3.4–5.3)
PSA SERPL DL<=0.01 NG/ML-MCNC: 0.57 NG/ML (ref 0–3.5)
SODIUM SERPL-SCNC: 140 MMOL/L (ref 135–145)
TRIGL SERPL-MCNC: 124 MG/DL

## 2024-11-06 PROCEDURE — 80061 LIPID PANEL: CPT | Mod: ORL | Performed by: STUDENT IN AN ORGANIZED HEALTH CARE EDUCATION/TRAINING PROGRAM

## 2024-11-06 PROCEDURE — G0103 PSA SCREENING: HCPCS | Mod: ORL | Performed by: STUDENT IN AN ORGANIZED HEALTH CARE EDUCATION/TRAINING PROGRAM

## 2024-11-06 PROCEDURE — 80048 BASIC METABOLIC PNL TOTAL CA: CPT | Mod: ORL | Performed by: STUDENT IN AN ORGANIZED HEALTH CARE EDUCATION/TRAINING PROGRAM
